# Patient Record
Sex: FEMALE | Race: WHITE | NOT HISPANIC OR LATINO | Employment: FULL TIME | URBAN - METROPOLITAN AREA
[De-identification: names, ages, dates, MRNs, and addresses within clinical notes are randomized per-mention and may not be internally consistent; named-entity substitution may affect disease eponyms.]

---

## 2024-09-26 ENCOUNTER — EVALUATION (OUTPATIENT)
Age: 54
End: 2024-09-26
Payer: COMMERCIAL

## 2024-09-26 DIAGNOSIS — G89.29 CHRONIC PAIN OF RIGHT KNEE: Primary | ICD-10-CM

## 2024-09-26 DIAGNOSIS — M17.11 OSTEOARTHRITIS OF RIGHT KNEE, UNSPECIFIED OSTEOARTHRITIS TYPE: ICD-10-CM

## 2024-09-26 DIAGNOSIS — Z01.818 PRE-OP EVALUATION: ICD-10-CM

## 2024-09-26 DIAGNOSIS — M25.561 CHRONIC PAIN OF RIGHT KNEE: Primary | ICD-10-CM

## 2024-09-26 PROCEDURE — 97161 PT EVAL LOW COMPLEX 20 MIN: CPT

## 2024-09-26 PROCEDURE — 97110 THERAPEUTIC EXERCISES: CPT

## 2024-09-26 NOTE — PROGRESS NOTES
PT Evaluation - Pre-Op Evaluation    Today's date: 2024  Patient name: Pati Meyers  : 1970  MRN: [id number]  Referring provider: Melvin Fernandes MD  Dx:   Encounter Diagnosis     ICD-10-CM    1. Chronic pain of right knee  M25.561     G89.29       2. Osteoarthritis of right knee, unspecified osteoarthritis type  M17.11       3. Pre-op evaluation  Z01.818           Start Time: 745  Stop Time: 830  Total time in clinic (min): 45 minutes    Assessment  Impairments: abnormal coordination, abnormal gait, abnormal muscle firing, abnormal muscle tone, abnormal or restricted ROM, activity intolerance, impaired balance, impaired physical strength, lacks appropriate home exercise program, pain with function, weight-bearing intolerance, poor posture  and poor body mechanics  Functional limitations: bending, lifting, carrying, walking, stair negotiation, transfers, bed mobility  Symptom irritability: high    Assessment details: Pati Meyers is a 54 y.o. female presenting for pre-op examination of R TKA scheduled for 10/3/2024.  Primary impairments include R knee pain with functional activities, RLE weakness, R knee flexion AROM dysfunction, quadriceps motor control dysfunction.  Educated pt on anatomy and physiology of diagnosis.  Will benefit from skilled PT interventions for community reintegration, ADL management/independence, return to work/hobbies.  Provided pt with written home exercise program to be completed daily.    Understanding of Dx/Px/POC: good     Prognosis: good    Goals    Short Term Goals:  In 6 weeks, the patient will:  1. Improve R knee extension to 0 degrees  2. Improve R knee flexion AROM to at least 110 degrees  3. Supervision with HEP for self-care    Long Term Goals:  In 12 weeks, the patient will:  1. Improve R hip flexion and R knee extension strength to at least 4+/5 MMT  2. FOTO to greater than predicted value  3. Independent with HEP for self-care      Plan  Patient  "would benefit from: skilled physical therapy  Planned modality interventions: manual electrical stimulation and cryotherapy    Planned therapy interventions: abdominal trunk stabilization, activity modification, balance, balance/weight bearing training, behavior modification, body mechanics training, community reintegration, coordination, fine motor coordination training, flexibility, functional ROM exercises, gait training, graded activity, graded exercise, graded motor, home exercise program, work reintegration, therapeutic training, therapeutic exercise, therapeutic activities, stretching, strengthening, self care, postural training, patient education, neuromuscular re-education, motor coordination training, massage, manual therapy, joint mobilization and ADL training    Frequency: 2-3x week  Duration in weeks: 12  Plan of Care beginning date: 9/26/2024  Plan of Care expiration date: 12/19/2024  Treatment plan discussed with: patient      Subjective    Pain Location: R medial knee, L lateral knee  Pain Intensity: at rest 4/10, worst 8/10 - constant dull ache, stabbing with stair negotiation  NAN: R TKA  DOI: 10/3/2024  Aggravating Factors: driving, steps, standing, walking, unsteady carrying things, bending over, bed mobility - rolling  Alleviating Factors: oral voltaren, topical voltaren, ice/heat, sleep with compression sleeves  Living Situation: unable to get in bathtub, independent ADLs  Constitutional S/S: lateral R knee n/t - ever since iovera treatment about 2 weeks ago  Goals: \"I would like to be active again, walk and do things with my , lose some weight\"  PLOF: also scheduled for L TKA in December      Objective  Standing Posture & Lower Extremity Alignment:  Structure/Joint         Pelvis (Tilt)  Anterior  Neutral x Posterior   Iliac Crests  Right Elevated x Neutral  Left Elevated   Knee - Frontal  x Genuvalgum  Neutral  Genuvarum   Knee - Sagittal  Genurecurvatum x Neutral     Rearfoot  Valgus " x Neutral  Varus   Forefoot  Abducted x Neutral     Arch x Pes Planus  Neutral  Pes Cavus     Range of Motion: Goniometric revealed the following findings (in degrees).  Joint Motion Right: 9/26/2024 Left: 9/26/2024   Knee Extension 0 0   Knee Flexion 105 107     Strength: MMT revealed the following findings.  Joint Motion Right: 9/26/2024 Left: 9/26/2024   Hip Flexion 3+/5 3+/5   Hip Abduction 3+/5 3+/5   Hip Adduction 3+/5 3+/5   Hip Extension 3+/5 3+/5   Knee Extension 3+/5 3+/5   Knee Flexion 3+/5 3+/5   Ankle Plantarflexion 4/5 4/5   Ankle Dorsiflexion 4/5 4/5     Additional Assessments:  Palpation: mild TTP medial R knee  Gait Pattern: antalgic  Balance: impaired  TUG: 10.44 seconds - UE use on STS descent    Risk Assessment and Prediction Tool results reviewed. Patient reported functional outcome scores reviewed. Discussed DOS and patient's questions were answered to patient's satisfaction. Mobility/ROM results per above. Strength results per above. Balance/Gait (including Timed Up & Go) per above. Virtual Home Assessment was reviewed with patient. Home Preparation Checklist was reviewed with patient including identification of care partner and encouragement of single level set-up. Post-operative pain management expectations discussed to the patient's satisfaction. Post-operative gait training for level ground, stairs, and car transfers was performed. Patient demonstrated competence with immediate post-operative home exercise program.           Precautions: R TKA 10/3/2024    POC expires Unit limit Auth Expiration date PT/OT + Visit Limit?   12/19/24 BOMN N/A BOMN     Visit/Unit Tracking  AUTH Status:  Date 9/26         Used 1         Remaining             Pertinent Findings:                                                                                            Test / Measure  9/26/2024   FOTO Pre-Op (Predicted 59) 40   R knee extension MMT 3+/5   R hip flexion MMT 3+/5   R knee ROM range 0-105   TUG  10.44s UE use on descent         Manuals 9/26            R knee PROM, stretching                                                    Neuro Re-Ed             Quad sets             Glute sets             Heel slides             Mini squats             QS + SLR                                       Ther Ex             HEP review / edu 10'            Rec bike             SAQ             LAQ             HR/TR             Gastroc S             Stad hip 3 way                          Ther Activity             STS             FSU             LSU                                       Gait Training                                       Modalities

## 2024-10-07 ENCOUNTER — OFFICE VISIT (OUTPATIENT)
Age: 54
End: 2024-10-07
Payer: COMMERCIAL

## 2024-10-07 DIAGNOSIS — G89.29 CHRONIC PAIN OF RIGHT KNEE: Primary | ICD-10-CM

## 2024-10-07 DIAGNOSIS — M25.561 CHRONIC PAIN OF RIGHT KNEE: Primary | ICD-10-CM

## 2024-10-07 DIAGNOSIS — M17.11 OSTEOARTHRITIS OF RIGHT KNEE, UNSPECIFIED OSTEOARTHRITIS TYPE: ICD-10-CM

## 2024-10-07 DIAGNOSIS — Z96.651 STATUS POST TOTAL RIGHT KNEE REPLACEMENT: ICD-10-CM

## 2024-10-07 PROCEDURE — 97164 PT RE-EVAL EST PLAN CARE: CPT

## 2024-10-07 PROCEDURE — 97140 MANUAL THERAPY 1/> REGIONS: CPT

## 2024-10-07 PROCEDURE — 97110 THERAPEUTIC EXERCISES: CPT

## 2024-10-07 PROCEDURE — 97112 NEUROMUSCULAR REEDUCATION: CPT

## 2024-10-07 NOTE — PROGRESS NOTES
First Post-Op     Today's date: 10/7/2024  Patient name: Pati Meyers  : 1970  MRN: [id number]  Referring provider: Melvin Fernandes MD  Dx:   Encounter Diagnosis     ICD-10-CM    1. Chronic pain of right knee  M25.561     G89.29       2. Osteoarthritis of right knee, unspecified osteoarthritis type  M17.11       3. Status post total right knee replacement  Z96.651           Start Time: 0900  Stop Time: 1000  Total time in clinic (min): 60 minutes    Subjective: Pt reports compliance with HEP.  R TKA on 10/3/2024.  Pain intensity at 6/10.  States that stair negotiation is better now than before surgery.  Ambulating with rollator currently.  Oxy prn but starting to wean off already - uses mostly with HEP and PT.  States that it feels very stiff.        Objective: See treatment diary below    Range of Motion: Goniometric revealed the following findings (in degrees).  Joint Motion Right: Left:   Knee Extension -2 0   Knee Flexion 80 107     Strength: MMT revealed the following findings.  Joint Motion Right:  Left:   Hip Flexion 3-/5 3+/5   Hip Abduction 3-/5 3+/5   Hip Adduction 3-/5 3+/5   Hip Extension 3-/5 3+/5   Knee Extension 3-/5 3+/5   Knee Flexion 3-/5 3+/5   Ankle Plantarflexion 4/5 4/5   Ankle Dorsiflexion 4/5 4/5     Additional Assessments:  Palpation: mild TTP global R knee, healing incision  Gait Pattern: antalgic, rollator  Balance: impaired  TU.5 seconds - UE assist, rollator    Assessment: Assessment details: Pati Meyers is a 54 y.o. female presenting for post-op of R TKA 10/3/2024.  Primary impairments include R knee pain with functional activities, RLE weakness, R knee flexion AROM dysfunction, quadriceps motor control dysfunction.  Educated pt on anatomy and physiology of diagnosis.  Will benefit from skilled PT interventions for community reintegration, ADL management/independence, return to work/hobbies.  Provided pt with written home exercise program to be completed  daily.  Tolerated treatment well. Patient would benefit from continued PT.  1:1 with Jaziel Aggarwal DPT entirety of tx.    Impairments: abnormal coordination, abnormal gait, abnormal muscle firing, abnormal muscle tone, abnormal or restricted ROM, activity intolerance, impaired balance, impaired physical strength, lacks appropriate home exercise program, pain with function, weight-bearing intolerance, poor posture  and poor body mechanics  Functional limitations: bending, lifting, carrying, walking, stair negotiation, transfers, bed mobility  Symptom irritability: high    Understanding of Dx/Px/POC: good     Prognosis: good    Goals    Short Term Goals:  In 6 weeks, the patient will:  1. Improve R knee extension to 0 degrees - ONGOING  2. Improve R knee flexion AROM to at least 110 degrees - ONGOING  3. Supervision with HEP for self-care - MET    Long Term Goals:  In 12 weeks, the patient will:  1. Improve R hip flexion and R knee extension strength to at least 4+/5 MMT - ONGOING  2. FOTO to greater than predicted value - ONGOING  3. Independent with HEP for self-care - ONGOING    Plan: Continue per plan of care.     Patient would benefit from: skilled physical therapy  Planned modality interventions: manual electrical stimulation and cryotherapy    Planned therapy interventions: abdominal trunk stabilization, activity modification, balance, balance/weight bearing training, behavior modification, body mechanics training, community reintegration, coordination, fine motor coordination training, flexibility, functional ROM exercises, gait training, graded activity, graded exercise, graded motor, home exercise program, work reintegration, therapeutic training, therapeutic exercise, therapeutic activities, stretching, strengthening, self care, postural training, patient education, neuromuscular re-education, motor coordination training, massage, manual therapy, joint mobilization and ADL training    Frequency: 2-3x  "week  Duration in weeks: 12  Plan of Care beginning date: 9/26/2024  Plan of Care expiration date: 12/19/2024  Treatment plan discussed with: patient     Precautions: R TKA 10/3/2024    POC expires Unit limit Auth Expiration date PT/OT + Visit Limit?   12/19/24 BOMN N/A BOMN     Visit/Unit Tracking  AUTH Status:  Date 9/26 10/7        Used 1 2        Remaining             Pertinent Findings:                                                                                            Test / Measure  9/26/2024 10/7/2024   FOTO Post-Op (Predicted 70) 40 Pre-Op 47 Post-Op   R knee extension MMT 3+/5 3-/5   R hip flexion MMT 3+/5 3-/5   R knee ROM range 0-105 2-80   TUG 10.44s UE use on descent 14.50s UE assist, rollator         Manuals 9/26 10/7   R knee PROM, stretching  MM 8'                  Neuro Re-Ed     Quad sets     Glute sets     Heel slides  10x10\" strap   Mini squats     QS + SLR  3x5             Ther Ex     HEP review / edu 10'    Nu-Step - LE only  5' L5   Rec bike     SAQ     LAQ  2x10   HR/TR  2x10 ea   Gastroc S  4x30\" strap   Stand hip 3 way  10x ea B        Ther Activity     STS     FSU     LSU               Gait Training               Modalities                    "

## 2024-10-09 ENCOUNTER — OFFICE VISIT (OUTPATIENT)
Age: 54
End: 2024-10-09
Payer: COMMERCIAL

## 2024-10-09 DIAGNOSIS — Z96.651 STATUS POST TOTAL RIGHT KNEE REPLACEMENT: ICD-10-CM

## 2024-10-09 DIAGNOSIS — G89.29 CHRONIC PAIN OF RIGHT KNEE: Primary | ICD-10-CM

## 2024-10-09 DIAGNOSIS — M17.11 OSTEOARTHRITIS OF RIGHT KNEE, UNSPECIFIED OSTEOARTHRITIS TYPE: ICD-10-CM

## 2024-10-09 DIAGNOSIS — M25.561 CHRONIC PAIN OF RIGHT KNEE: Primary | ICD-10-CM

## 2024-10-09 PROCEDURE — 97110 THERAPEUTIC EXERCISES: CPT

## 2024-10-09 PROCEDURE — 97112 NEUROMUSCULAR REEDUCATION: CPT

## 2024-10-09 PROCEDURE — 97140 MANUAL THERAPY 1/> REGIONS: CPT

## 2024-10-09 NOTE — PROGRESS NOTES
"Daily Note     Today's date: 10/9/2024  Patient name: Pati Meyers  : 1970  MRN: [id number]  Referring provider: Melvin Fernandes MD  Dx:   Encounter Diagnosis     ICD-10-CM    1. Chronic pain of right knee  M25.561     G89.29       2. Osteoarthritis of right knee, unspecified osteoarthritis type  M17.11       3. Status post total right knee replacement  Z96.651           Start Time: 1215  Stop Time: 1300  Total time in clinic (min): 45 minutes    Subjective: Pt reports significant soreness and stiffness following last tx session.  Took pain medication prior to tx session.       Objective: See treatment diary below    Knee flexion = 102 degrees PROM with overpressure    Assessment: Tolerated treatment well. Patient would benefit from continued PT.  Pt able to tolerate continued progression of POC.  Continues to have lateral R knee pain.  R knee extension WNL, working on flexion.  Quad motor control is improving.  1:1 with Jaziel Aggarwal DPT entirety of tx.        Plan: Continue per plan of care.  Progress treatment as tolerated.       Precautions: R TKA 10/3/2024    POC expires Unit limit Auth Expiration date PT/OT + Visit Limit?   24 BOMN N/A BOMN     Visit/Unit Tracking  AUTH Status:  Date 9/26 10/7        Used 1 2        Remaining             Pertinent Findings:                                                                                            Test / Measure  2024 10/7/2024   FOTO Post-Op (Predicted 70) 40 Pre-Op 47 Post-Op   R knee extension MMT 3+/5 3-/5   R hip flexion MMT 3+/5 3-/5   R knee ROM range 0-105 2-80   TUG 10.44s UE use on descent 14.50s UE assist, rollator         Manuals 9/26 10/7 10/9   R knee PROM, stretching  MM 8' MM 8'                     Neuro Re-Ed      Quad sets      Glute sets      Heel slides  10x10\" strap 12x10\" strap   Mini squats      QS + SLR  3x5 3x8               Ther Ex      HEP review / edu 10'     Nu-Step - LE only  5' L5 5' L5   Rec bike    " "  LAQ  2x10 3x10   HR/TR  2x10 ea 2x10 ea   Gastroc S  4x30\" strap    Stand hip 3 way  10x ea B 12x ea B         Ther Activity      STS      FSU      LSU                  Gait Training                  Modalities                         "

## 2024-10-11 ENCOUNTER — OFFICE VISIT (OUTPATIENT)
Age: 54
End: 2024-10-11
Payer: COMMERCIAL

## 2024-10-11 DIAGNOSIS — M25.561 CHRONIC PAIN OF RIGHT KNEE: Primary | ICD-10-CM

## 2024-10-11 DIAGNOSIS — M17.11 OSTEOARTHRITIS OF RIGHT KNEE, UNSPECIFIED OSTEOARTHRITIS TYPE: ICD-10-CM

## 2024-10-11 DIAGNOSIS — G89.29 CHRONIC PAIN OF RIGHT KNEE: Primary | ICD-10-CM

## 2024-10-11 DIAGNOSIS — Z96.651 STATUS POST TOTAL RIGHT KNEE REPLACEMENT: ICD-10-CM

## 2024-10-11 PROCEDURE — 97110 THERAPEUTIC EXERCISES: CPT

## 2024-10-11 PROCEDURE — 97140 MANUAL THERAPY 1/> REGIONS: CPT

## 2024-10-11 PROCEDURE — 97112 NEUROMUSCULAR REEDUCATION: CPT

## 2024-10-11 NOTE — PROGRESS NOTES
"Daily Note     Today's date: 10/11/2024  Patient name: Pati Meyers  : 1970  MRN: [id number]  Referring provider: Melvin Fernandes MD  Dx:   Encounter Diagnosis     ICD-10-CM    1. Chronic pain of right knee  M25.561     G89.29       2. Osteoarthritis of right knee, unspecified osteoarthritis type  M17.11       3. Status post total right knee replacement  Z96.651           Start Time: 1055  Stop Time: 1140  Total time in clinic (min): 45 minutes    Subjective: Pt reports improved independence, but she also notes an increase in swelling due to increased activity.      Objective: See treatment diary below    R knee flexion PROM 116 degrees; R knee flexion AROM 96 degrees    Assessment: Tolerated treatment well. Patient would benefit from continued PT.  Continued knee mobility improvement.  Added in functional training exercises this visit.  Discomfort persists but is much more tolerable to overpressure.  1:1 with Jaziel Aggarwal DPT entirety of tx.        Plan: Continue per plan of care.  Progress treatment as tolerated.       Precautions: R TKA 10/3/2024    POC expires Unit limit Auth Expiration date PT/OT + Visit Limit?   24 BOMN N/A BOMN     Visit/Unit Tracking  AUTH Status:  Date 9/26 10/7 10/11       Used 1 2 3       Remaining             Pertinent Findings:                                                                                            Test / Measure  2024 10/7/2024   FOTO Post-Op (Predicted 70) 40 Pre-Op 47 Post-Op   R knee extension MMT 3+/5 3-/5   R hip flexion MMT 3+/5 3-/5   R knee ROM range 0-105 2-80   TUG 10.44s UE use on descent 14.50s UE assist, rollator         Manuals 9/26 10/7 10/9 10/11   R knee PROM, stretching  MM 8' MM 8' MM 8'                        Neuro Re-Ed       Quad sets       Glute sets       Heel slides  10x10\" strap 12x10\" strap 15x10\" strap   Mini squats       QS + SLR  3x5 3x8 3x10   Sidestepping    5 laps          Ther Ex       HEP review / edu " "10'      Nu-Step - LE only  5' L5 5' L5 6' L5   Rec bike       LAQ  2x10 3x10 3x10 1#   HR/TR  2x10 ea 2x10 ea 2x10 ea   Gastroc S  4x30\" strap     Stand hip 3 way  10x ea B 12x ea B 2x15 ea B          Ther Activity       STS    2x10 22\" plinth   FSU       LSU                     Gait Training       No AD  10ft 2x20ft 2x50ft          Modalities                              "

## 2024-10-14 ENCOUNTER — OFFICE VISIT (OUTPATIENT)
Age: 54
End: 2024-10-14
Payer: COMMERCIAL

## 2024-10-14 DIAGNOSIS — M17.11 OSTEOARTHRITIS OF RIGHT KNEE, UNSPECIFIED OSTEOARTHRITIS TYPE: ICD-10-CM

## 2024-10-14 DIAGNOSIS — M25.561 CHRONIC PAIN OF RIGHT KNEE: Primary | ICD-10-CM

## 2024-10-14 DIAGNOSIS — Z96.651 STATUS POST TOTAL RIGHT KNEE REPLACEMENT: ICD-10-CM

## 2024-10-14 DIAGNOSIS — G89.29 CHRONIC PAIN OF RIGHT KNEE: Primary | ICD-10-CM

## 2024-10-14 PROCEDURE — 97530 THERAPEUTIC ACTIVITIES: CPT

## 2024-10-14 PROCEDURE — 97112 NEUROMUSCULAR REEDUCATION: CPT

## 2024-10-14 PROCEDURE — 97110 THERAPEUTIC EXERCISES: CPT

## 2024-10-14 PROCEDURE — 97140 MANUAL THERAPY 1/> REGIONS: CPT

## 2024-10-14 NOTE — PROGRESS NOTES
Daily Note     Today's date: 10/14/2024  Patient name: Pati Meyers  : 1970  MRN: [id number]  Referring provider: Melvin Fernandes MD  Dx:   Encounter Diagnosis     ICD-10-CM    1. Chronic pain of right knee  M25.561     G89.29       2. Osteoarthritis of right knee, unspecified osteoarthritis type  M17.11       3. Status post total right knee replacement  Z96.651           Start Time: 856  Stop Time: 945  Total time in clinic (min): 49 minutes    Subjective: Pt reports continued RLE swelling; although, walking tolerance/distance is improving.  Compliant with HEP.  Continuing to ice and elevate.  Getting lymphatic massage later today.        Objective: See treatment diary below      Assessment: Tolerated treatment well. Patient would benefit from continued PT.  Pt progressing well.  R knee mobility is improving, and she is better able to tolerate stretching + overpressure into flexion.  Completed stair negotiation at low step height - pt required UE assistance on parallel bar to complete due to apprehension.  Able to complete full revolution on recumbent bike - discussed riding stationary bike at home.  1:1 with Jaziel Aggarwal DPT entirety of tx.        Plan: Continue per plan of care.  Progress treatment as tolerated.       Precautions: R TKA 10/3/2024    POC expires Unit limit Auth Expiration date PT/OT + Visit Limit?   24 BOMN N/A BOMN     Visit/Unit Tracking  AUTH Status:  Date 9/26 10/7 10/11 10/14      Used 1 2 3 4      Remaining             Pertinent Findings:                                                                                            Test / Measure  2024 10/7/2024   FOTO Post-Op (Predicted 70) 40 Pre-Op 47 Post-Op   R knee extension MMT 3+/5 3-/5   R hip flexion MMT 3+/5 3-/5   R knee ROM range 0-105 2-80   TUG 10.44s UE use on descent 14.50s UE assist, rollator         Manuals 9/26 10/7 10/9 10/11 10/14   R knee PROM, stretching  MM 8' MM 8' MM 8' MM 8'               "             Neuro Re-Ed        Heel slides  10x10\" strap 12x10\" strap 15x10\" strap 15x10\" strap   Mini squats        QS + SLR  3x5 3x8 3x10 3x10   Sidestepping    5 laps            Ther Ex        HEP review / edu 10'       Nu-Step - LE only  5' L5 5' L5 6' L5 8' L6   Rec bike     5' L1   LAQ  2x10 3x10 3x10 1# 3x10 1#   HR/TR  2x10 ea 2x10 ea 2x10 ea    Gastroc S  4x30\" strap      Stand hip 3 way  10x ea B 12x ea B 2x15 ea B            Ther Activity        STS    2x10 22\" plinth 2x10 22\" plinth   FSU     2x10 4\"   LSU     2x10 4\"                   Gait Training        No AD  10ft 2x20ft 2x50ft T/o tx session           Modalities                                   "

## 2024-10-16 ENCOUNTER — OFFICE VISIT (OUTPATIENT)
Age: 54
End: 2024-10-16
Payer: COMMERCIAL

## 2024-10-16 DIAGNOSIS — Z96.651 STATUS POST TOTAL RIGHT KNEE REPLACEMENT: ICD-10-CM

## 2024-10-16 DIAGNOSIS — G89.29 CHRONIC PAIN OF RIGHT KNEE: Primary | ICD-10-CM

## 2024-10-16 DIAGNOSIS — M25.561 CHRONIC PAIN OF RIGHT KNEE: Primary | ICD-10-CM

## 2024-10-16 DIAGNOSIS — M17.11 OSTEOARTHRITIS OF RIGHT KNEE, UNSPECIFIED OSTEOARTHRITIS TYPE: ICD-10-CM

## 2024-10-16 PROCEDURE — 97110 THERAPEUTIC EXERCISES: CPT

## 2024-10-16 PROCEDURE — 97530 THERAPEUTIC ACTIVITIES: CPT

## 2024-10-16 PROCEDURE — 97116 GAIT TRAINING THERAPY: CPT

## 2024-10-16 PROCEDURE — 97112 NEUROMUSCULAR REEDUCATION: CPT

## 2024-10-16 PROCEDURE — 97140 MANUAL THERAPY 1/> REGIONS: CPT

## 2024-10-16 NOTE — PROGRESS NOTES
Daily Note     Today's date: 10/16/2024  Patient name: Pati Meyers  : 1970  MRN: [id number]  Referring provider: Melvin Fernandes MD  Dx:   Encounter Diagnosis     ICD-10-CM    1. Chronic pain of right knee  M25.561     G89.29       2. Osteoarthritis of right knee, unspecified osteoarthritis type  M17.11       3. Status post total right knee replacement  Z96.651           Start Time: 0800  Stop Time: 0840  Total time in clinic (min): 40 minutes    Subjective: Pt able to get onto [employer] at home for about 5 minutes.  Got a lymphatic massage earlier this week - scheduled for another next week.  States that ankle feels less swollen now.  States that BP was low after surgery so physician had her stop taking medication - thinks she might have to start taking it again.        Objective: See treatment diary below    BP:  150/100 after warm-up, 160/105 after step ups and STS  Knee flexion PROM = 117 degrees    Assessment: Tolerated treatment well. Patient would benefit from continued PT.  Discussed contacting surgeon and/or PCP to start taking BP medications.  Tolerated slight progression of planned therapeutic interventions this visit.  R knee flexion ROM is steadily improving.  Utilized quad stretch secondary to pt having tightness superior to incision site.  1:1 with Jaziel Aggarwal DPT entirety of tx.        Plan: Continue per plan of care.  Progress treatment as tolerated.       Precautions: R TKA 10/3/2024    POC expires Unit limit Auth Expiration date PT/OT + Visit Limit?   24 BOMN N/A BOMN     Visit/Unit Tracking  AUTH Status:  Date 9/26 10/7 10/11 10/14 10/16    Used 1 2 3 4 5    Remaining            Pertinent Findings:                                                                                            Test / Measure  2024 10/7/2024   FOTO Post-Op (Predicted 70) 40 Pre-Op 47 Post-Op   R knee extension MMT 3+/5 3-/5   R hip flexion MMT 3+/5 3-/5   R knee ROM range 0-105 2-80  "  TUG 10.44s UE use on descent 14.50s UE assist, rollator         Manuals 9/26 10/7 10/9 10/11 10/14 10/16   R knee PROM, stretching  MM 8' MM 8' MM 8' MM 8' MM 8'                              Neuro Re-Ed         Heel slides  10x10\" strap 12x10\" strap 15x10\" strap 15x10\" strap 15x10\" strap   Mini squats         QS + SLR  3x5 3x8 3x10 3x10 10x 0#  2x10 1#   Sidestepping    5 laps              Ther Ex         HEP review / edu 10'        Nu-Step - LE only  5' L5 5' L5 6' L5 8' L6 8' L6   Rec bike     5' L1    LAQ  2x10 3x10 3x10 1# 3x10 1# 3x10 2#   HR/TR  2x10 ea 2x10 ea 2x10 ea     Gastroc S  4x30\" strap       Stand hip 3 way  10x ea B 12x ea B 2x15 ea B     Prone quad S      3x30\" strap   Ther Activity         STS    2x10 22\" plinth 2x10 22\" plinth 2x10 chair + foam   FSU     2x10 4\" 2x10 4\"  10x 6\"   LSU     2x10 4\" 2x10 4\"                     Gait Training         No AD  10ft 2x20ft 2x50ft T/o tx session T/o tx session   SPC      2x30ft   Modalities                                        "

## 2024-10-18 ENCOUNTER — OFFICE VISIT (OUTPATIENT)
Age: 54
End: 2024-10-18
Payer: COMMERCIAL

## 2024-10-18 DIAGNOSIS — M25.561 CHRONIC PAIN OF RIGHT KNEE: Primary | ICD-10-CM

## 2024-10-18 DIAGNOSIS — G89.29 CHRONIC PAIN OF RIGHT KNEE: Primary | ICD-10-CM

## 2024-10-18 DIAGNOSIS — Z96.651 STATUS POST TOTAL RIGHT KNEE REPLACEMENT: ICD-10-CM

## 2024-10-18 DIAGNOSIS — M17.11 OSTEOARTHRITIS OF RIGHT KNEE, UNSPECIFIED OSTEOARTHRITIS TYPE: ICD-10-CM

## 2024-10-18 PROCEDURE — 97140 MANUAL THERAPY 1/> REGIONS: CPT

## 2024-10-18 PROCEDURE — 97110 THERAPEUTIC EXERCISES: CPT

## 2024-10-18 PROCEDURE — 97530 THERAPEUTIC ACTIVITIES: CPT

## 2024-10-18 PROCEDURE — 97112 NEUROMUSCULAR REEDUCATION: CPT

## 2024-10-18 NOTE — PROGRESS NOTES
"Daily Note     Today's date: 10/18/2024  Patient name: Pati Meyers  : 1970  MRN: [id number]  Referring provider: Melvin Fernandes MD  Dx:   Encounter Diagnosis     ICD-10-CM    1. Chronic pain of right knee  M25.561     G89.29       2. Osteoarthritis of right knee, unspecified osteoarthritis type  M17.11       3. Status post total right knee replacement  Z96.651           Start Time: 0800  Stop Time: 0832  Total time in clinic (min): 32 minutes    Subjective: Pt reports being able to get on stationary bike but had significant difficulty doing so.  Able to complete full revolution.        Objective: See treatment diary below    AROM flexion = 104 degrees  PROM flexion = 116 degrees    Assessment: Tolerated treatment well. Patient would benefit from continued PT.  R knee status is continually improving.  Functionality improves as strength, motor control, and mobility improve.  Challenged with slight progression of planned therapeutic interventions.  Quadriceps motor control remains limited at this time.  1:1 with Jaziel Aggarwal DPT entirety of tx.        Plan: Continue per plan of care.  Progress treatment as tolerated.       Precautions: R TKA 10/3/2024    POC expires Unit limit Auth Expiration date PT/OT + Visit Limit?   24 BOMN N/A BOMN     Visit/Unit Tracking  AUTH Status:  Date 9/26 10/7 10/11 10/14 10/16 10/18    Used 1 2 3 4 5 6    Remaining             Pertinent Findings:                                                                                            Test / Measure  2024 10/7/2024   FOTO Post-Op (Predicted 70) 40 Pre-Op 47 Post-Op   R knee extension MMT 3+/5 3-/5   R hip flexion MMT 3+/5 3-/5   R knee ROM range 0-105 2-80   TUG 10.44s UE use on descent 14.50s UE assist, rollator         Manuals 9/26 10/7 10/9 10/11 10/14 10/16 10/18   R knee PROM, stretching  MM 8' MM 8' MM 8' MM 8' MM 8' MM 8'                                 Neuro Re-Ed          Heel slides  10x10\" strap " "12x10\" strap 15x10\" strap 15x10\" strap 15x10\" strap 15x10\" strap   Mini squats          QS + SLR  3x5 3x8 3x10 3x10 10x 0#  2x10 1# 3x10 1#   Sidestepping    5 laps      TKE       2x10 stand gtb   Ther Ex          HEP review / edu 10'         Nu-Step - LE only  5' L5 5' L5 6' L5 8' L6 8' L6 8' L6   Rec bike     5' L1     LAQ  2x10 3x10 3x10 1# 3x10 1# 3x10 2# 3x10 2#   HR/TR  2x10 ea 2x10 ea 2x10 ea      Gastroc S  4x30\" strap        Stand hip 3 way  10x ea B 12x ea B 2x15 ea B      Prone quad S      3x30\" strap 4x30\" strap   Ther Activity          STS    2x10 22\" plinth 2x10 22\" plinth 2x10 chair + foam 2x10 5# chair + foam   FSU     2x10 4\" 2x10 4\"  10x 6\" 2x10 6\"   LSU     2x10 4\" 2x10 4\" 2x10 6\"                       Gait Training          No AD  10ft 2x20ft 2x50ft T/o tx session T/o tx session    SPC      2x30ft    Modalities                                             "

## 2024-10-21 ENCOUNTER — OFFICE VISIT (OUTPATIENT)
Age: 54
End: 2024-10-21
Payer: COMMERCIAL

## 2024-10-21 DIAGNOSIS — M25.561 CHRONIC PAIN OF RIGHT KNEE: Primary | ICD-10-CM

## 2024-10-21 DIAGNOSIS — M17.11 OSTEOARTHRITIS OF RIGHT KNEE, UNSPECIFIED OSTEOARTHRITIS TYPE: ICD-10-CM

## 2024-10-21 DIAGNOSIS — G89.29 CHRONIC PAIN OF RIGHT KNEE: Primary | ICD-10-CM

## 2024-10-21 DIAGNOSIS — Z01.818 PRE-OP EVALUATION: ICD-10-CM

## 2024-10-21 DIAGNOSIS — Z96.651 STATUS POST TOTAL RIGHT KNEE REPLACEMENT: ICD-10-CM

## 2024-10-21 PROCEDURE — 97110 THERAPEUTIC EXERCISES: CPT | Performed by: PHYSICAL THERAPIST

## 2024-10-21 PROCEDURE — 97140 MANUAL THERAPY 1/> REGIONS: CPT | Performed by: PHYSICAL THERAPIST

## 2024-10-21 PROCEDURE — 97112 NEUROMUSCULAR REEDUCATION: CPT | Performed by: PHYSICAL THERAPIST

## 2024-10-21 NOTE — PROGRESS NOTES
"Daily Note     Today's date: 10/21/2024  Patient name: Pati Meyers  : 1970  MRN: [id number]  Referring provider: Melvin Fernandes MD  Dx:   Encounter Diagnosis     ICD-10-CM    1. Chronic pain of right knee  M25.561     G89.29       2. Osteoarthritis of right knee, unspecified osteoarthritis type  M17.11       3. Status post total right knee replacement  Z96.651       4. Pre-op evaluation  Z01.818           Start Time: 0850  Stop Time: 09  Total time in clinic (min): 45 minutes    Subjective: Progressing well, reduced pian and imrpoved ROM.    Objective: See treatment diary below    Assessment: Tolerated treatment well. Patient demonstrated fatigue post treatment, exhibited good technique with therapeutic exercises, and would benefit from continued PT 1:1 with Danilo Pierce DPT for entirety of tx. Excellent knee ROM, BP reading 150/100 as prior visit. Instrcuted to f/u with PCP regarding BP. Reduced cardiovascular load due to pt BP.       Plan: Continue per plan of care.      Precautions: R TKA 10/3/2024    POC expires Unit limit Auth Expiration date PT/OT + Visit Limit?   24 BOMN N/A BOMN     Visit/Unit Tracking  AUTH Status:  Date 9/26 10/7 10/11 10/14 10/16 10/18 10/21    Used 1 2 3 4 5 6 7    Remaining              Pertinent Findings:                                                                                            Test / Measure  2024 10/7/2024   FOTO Post-Op (Predicted 70) 40 Pre-Op 47 Post-Op   R knee extension MMT 3+/5 3-/5   R hip flexion MMT 3+/5 3-/5   R knee ROM range 0-105 2-80   TUG 10.44s UE use on descent 14.50s UE assist, rollator         Manuals 9/26 10/7 10/9 10/11 10/14 10/16 10/18 10/21   R knee PROM, stretching  MM 8' MM 8' MM 8' MM 8' MM 8' MM 8'                                     Neuro Re-Ed           Heel slides  10x10\" strap 12x10\" strap 15x10\" strap 15x10\" strap 15x10\" strap 15x10\" strap 15x10\" strap   Mini squats           QS + SLR  3x5 3x8 " "3x10 3x10 10x 0#  2x10 1# 3x10 1# 3x10 2#   Sidestepping    5 laps       TKE       2x10 stand gtb    Ther Ex           HEP review / edu 10'          Nu-Step - LE only  5' L5 5' L5 6' L5 8' L6 8' L6 8' L6    Rec bike     5' L1   10'   LAQ  2x10 3x10 3x10 1# 3x10 1# 3x10 2# 3x10 2# 3x10 2#   HR/TR  2x10 ea 2x10 ea 2x10 ea       Gastroc S  4x30\" strap         Stand hip 3 way  10x ea B 12x ea B 2x15 ea B       Prone quad S      3x30\" strap 4x30\" strap 4x30\" strap   Ther Activity           STS    2x10 22\" plinth 2x10 22\" plinth 2x10 chair + foam 2x10 5# chair + foam def   FSU     2x10 4\" 2x10 4\"  10x 6\" 2x10 6\" def   LSU     2x10 4\" 2x10 4\" 2x10 6\" def                         Gait Training           No AD  10ft 2x20ft 2x50ft T/o tx session T/o tx session     SPC      2x30ft     Modalities                                                  "

## 2024-10-23 ENCOUNTER — OFFICE VISIT (OUTPATIENT)
Age: 54
End: 2024-10-23
Payer: COMMERCIAL

## 2024-10-23 DIAGNOSIS — Z96.651 STATUS POST TOTAL RIGHT KNEE REPLACEMENT: ICD-10-CM

## 2024-10-23 DIAGNOSIS — M17.11 OSTEOARTHRITIS OF RIGHT KNEE, UNSPECIFIED OSTEOARTHRITIS TYPE: ICD-10-CM

## 2024-10-23 DIAGNOSIS — G89.29 CHRONIC PAIN OF RIGHT KNEE: Primary | ICD-10-CM

## 2024-10-23 DIAGNOSIS — M25.561 CHRONIC PAIN OF RIGHT KNEE: Primary | ICD-10-CM

## 2024-10-23 PROCEDURE — 97530 THERAPEUTIC ACTIVITIES: CPT

## 2024-10-23 PROCEDURE — 97112 NEUROMUSCULAR REEDUCATION: CPT

## 2024-10-23 PROCEDURE — 97110 THERAPEUTIC EXERCISES: CPT

## 2024-10-23 PROCEDURE — 97140 MANUAL THERAPY 1/> REGIONS: CPT

## 2024-10-23 NOTE — PROGRESS NOTES
Daily Note     Today's date: 10/23/2024  Patient name: Pati Meyers  : 1970  MRN: [id number]  Referring provider: Melvin Fernandes MD  Dx:   Encounter Diagnosis     ICD-10-CM    1. Chronic pain of right knee  M25.561     G89.29       2. Osteoarthritis of right knee, unspecified osteoarthritis type  M17.11       3. Status post total right knee replacement  Z96.651           Start Time: 816  Stop Time: 848  Total time in clinic (min): 32 minutes    Subjective: Pt reports continued difficulty sleeping.  First tx session today without taking pain medication prior.      Objective: See treatment diary below    R knee flexion PROM = 118 degrees    Assessment: Tolerated treatment well. Patient would benefit from continued PT.  Pt able to tolerate continued POC this visit.  Appropriate challenge/fatigue with current POC.  Utilize progressive overload principles with strengthening and motor control interventions.  Moderate fatigue noted post-session.  Knee ROM is gradually improving.  1:1 with Jaziel Aggarwal DPT entirety of tx.        Plan: Continue per plan of care.  Progress treatment as tolerated.       Precautions: R TKA 10/3/2024    POC expires Unit limit Auth Expiration date PT/OT + Visit Limit?   24 BOMN N/A BOMN     Visit/Unit Tracking  AUTH Status:  Date 9/26 10/7 10/11 10/14 10/16 10/18 10/21 10/23    Used 1 2 3 4 5 6 7 8    Remaining               Pertinent Findings:                                                                                            Test / Measure  2024 10/7/2024   FOTO Post-Op (Predicted 70) 40 Pre-Op 47 Post-Op   R knee extension MMT 3+/5 3-/5   R hip flexion MMT 3+/5 3-/5   R knee ROM range 0-105 2-80   TUG 10.44s UE use on descent 14.50s UE assist, rollator         Manuals 9/26 10/7 10/9 10/11 10/14 10/16 10/18 10/21 10/23   R knee PROM, stretching  MM 8' MM 8' MM 8' MM 8' MM 8' MM 8'  MM 8'                                       Neuro Re-Ed            Heel  "slides  10x10\" strap 12x10\" strap 15x10\" strap 15x10\" strap 15x10\" strap 15x10\" strap 15x10\" strap 15x10\" strap   Mini squats            QS + SLR  3x5 3x8 3x10 3x10 10x 0#  2x10 1# 3x10 1# 3x10 2# 3x10 1#   Sidestepping    5 laps        TKE       2x10 stand gtb  2x10 stand 9#   Ther Ex            HEP review / edu 10'           Nu-Step - LE only  5' L5 5' L5 6' L5 8' L6 8' L6 8' L6  8' L6   Rec bike     5' L1   10'    LAQ  2x10 3x10 3x10 1# 3x10 1# 3x10 2# 3x10 2# 3x10 2# 3x10 2#   HR/TR  2x10 ea 2x10 ea 2x10 ea        Gastroc S  4x30\" strap          Stand hip 3 way  10x ea B 12x ea B 2x15 ea B        Prone quad S      3x30\" strap 4x30\" strap 4x30\" strap    Ther Activity            STS    2x10 22\" plinth 2x10 22\" plinth 2x10 chair + foam 2x10 5# chair + foam def 2x10 5# chair + foam   FSU     2x10 4\" 2x10 4\"  10x 6\" 2x10 6\" def 2x10 6\"   LSU     2x10 4\" 2x10 4\" 2x10 6\" def 2x10 6\"                           Gait Training            No AD  10ft 2x20ft 2x50ft T/o tx session T/o tx session      SPC      2x30ft      Modalities                                                       "

## 2024-10-25 ENCOUNTER — OFFICE VISIT (OUTPATIENT)
Age: 54
End: 2024-10-25
Payer: COMMERCIAL

## 2024-10-25 DIAGNOSIS — Z96.651 STATUS POST TOTAL RIGHT KNEE REPLACEMENT: ICD-10-CM

## 2024-10-25 DIAGNOSIS — M17.11 OSTEOARTHRITIS OF RIGHT KNEE, UNSPECIFIED OSTEOARTHRITIS TYPE: ICD-10-CM

## 2024-10-25 DIAGNOSIS — M25.561 CHRONIC PAIN OF RIGHT KNEE: Primary | ICD-10-CM

## 2024-10-25 DIAGNOSIS — G89.29 CHRONIC PAIN OF RIGHT KNEE: Primary | ICD-10-CM

## 2024-10-25 PROCEDURE — 97112 NEUROMUSCULAR REEDUCATION: CPT

## 2024-10-25 PROCEDURE — 97530 THERAPEUTIC ACTIVITIES: CPT

## 2024-10-25 PROCEDURE — 97140 MANUAL THERAPY 1/> REGIONS: CPT

## 2024-10-25 PROCEDURE — 97110 THERAPEUTIC EXERCISES: CPT

## 2024-10-25 NOTE — PROGRESS NOTES
Daily Note     Today's date: 10/25/2024  Patient name: Pati Meyers  : 1970  MRN: [id number]  Referring provider: Melvin Fernandes MD  Dx:   Encounter Diagnosis     ICD-10-CM    1. Chronic pain of right knee  M25.561     G89.29       2. Osteoarthritis of right knee, unspecified osteoarthritis type  M17.11       3. Status post total right knee replacement  Z96.651           Start Time: 0800  Stop Time: 09  Total time in clinic (min): 60 minutes    Subjective: Pt reports R knee status is steadily improving.  Concerned with gait pattern and looking down at feet when walking.        Objective: See treatment diary below    R knee flexion PROM = 120 degrees    Assessment: Tolerated treatment well. Patient would benefit from continued PT.  Functionality is gradually improving but remains limited at this time.  Had significant difficulty with eccentric step downs due to quadriceps weakness.  Quadriceps motor control is improving as well but fatigues towards end of tx session.  Hip abductor weakness observed with compensated trendelenberg gait.  1:1 with Jaziel Aggarwal DPT entirety of tx.        Plan: Continue per plan of care.  Progress treatment as tolerated.       Precautions: R TKA 10/3/2024    POC expires Unit limit Auth Expiration date PT/OT + Visit Limit?   24 BOMN N/A BOMN     Visit/Unit Tracking  AUTH Status:  Date 10/11 10/14 10/16 10/18 10/21 10/23 10/25    Used 3 4 5 6 7 8 9    Remaining              Pertinent Findings:                                                                                            Test / Measure  2024 10/7/2024   FOTO Post-Op (Predicted 70) 40 Pre-Op 47 Post-Op   R knee extension MMT 3+/5 3-/5   R hip flexion MMT 3+/5 3-/5   R knee ROM range 0-105 2-80   TUG 10.44s UE use on descent 14.50s UE assist, rollator         Manuals 10/7 10/9 10/11 10/14 10/16 10/18 10/21 10/23 10/25   R knee PROM, stretching MM 8' MM 8' MM 8' MM 8' MM 8' MM 8'  MM 8' MM 8'          "                              Neuro Re-Ed            Heel slides 10x10\" strap 12x10\" strap 15x10\" strap 15x10\" strap 15x10\" strap 15x10\" strap 15x10\" strap 15x10\" strap 15x10\" strap   Mini squats            QS + SLR 3x5 3x8 3x10 3x10 10x 0#  2x10 1# 3x10 1# 3x10 2# 3x10 1# 3x10 1#   Sidestepping   5 laps      3 laps otb   TKE      2x10 stand gtb  2x10 stand 9# 2x10 stand 10#   Ther Ex            HEP review / edu            Nu-Step - LE only 5' L5 5' L5 6' L5 8' L6 8' L6 8' L6  8' L6    Rec bike    5' L1   10'  8' L1   LAQ 2x10 3x10 3x10 1# 3x10 1# 3x10 2# 3x10 2# 3x10 2# 3x10 2# 3x10 3#   HR/TR 2x10 ea 2x10 ea 2x10 ea         Gastroc S 4x30\" strap           Stand hip 3 way 10x ea B 12x ea B 2x15 ea B         Prone quad S     3x30\" strap 4x30\" strap  4x30\" strap 4x30\" strap   Ther Activity            STS   2x10 22\" plinth 2x10 22\" plinth 2x10 chair + foam 2x10 5# chair + foam def 2x10 5# chair + foam 2x10 8# chair + foam   FSU    2x10 4\" 2x10 4\"  10x 6\" 2x10 6\" def 2x10 6\" 15x 6\"   LSU    2x10 4\" 2x10 4\" 2x10 6\" def 2x10 6\" 15x 6\"   Ecc lat step downs         10x 4\" difficult                           Gait Training            No AD 10ft 2x20ft 2x50ft T/o tx session T/o tx session    Assess 1 lap   SPC     2x30ft       Modalities                                                         "

## 2024-10-28 ENCOUNTER — OFFICE VISIT (OUTPATIENT)
Age: 54
End: 2024-10-28
Payer: COMMERCIAL

## 2024-10-28 DIAGNOSIS — M25.561 CHRONIC PAIN OF RIGHT KNEE: Primary | ICD-10-CM

## 2024-10-28 DIAGNOSIS — G89.29 CHRONIC PAIN OF RIGHT KNEE: Primary | ICD-10-CM

## 2024-10-28 DIAGNOSIS — M17.11 OSTEOARTHRITIS OF RIGHT KNEE, UNSPECIFIED OSTEOARTHRITIS TYPE: ICD-10-CM

## 2024-10-28 DIAGNOSIS — Z96.651 STATUS POST TOTAL RIGHT KNEE REPLACEMENT: ICD-10-CM

## 2024-10-28 PROCEDURE — 97140 MANUAL THERAPY 1/> REGIONS: CPT

## 2024-10-28 PROCEDURE — 97530 THERAPEUTIC ACTIVITIES: CPT

## 2024-10-28 PROCEDURE — 97110 THERAPEUTIC EXERCISES: CPT

## 2024-10-28 PROCEDURE — 97112 NEUROMUSCULAR REEDUCATION: CPT

## 2024-10-28 NOTE — PROGRESS NOTES
"Daily Note     Today's date: 10/28/2024  Patient name: aPti Meyers  : 1970  MRN: [id number]  Referring provider: Melvin Fernandes MD  Dx:   Encounter Diagnosis     ICD-10-CM    1. Chronic pain of right knee  M25.561     G89.29       2. Osteoarthritis of right knee, unspecified osteoarthritis type  M17.11       3. Status post total right knee replacement  Z96.651           Start Time: 1408  Stop Time: 1445  Total time in clinic (min): 37 minutes    Subjective: Pt reports being able to ride the stationary bike at home, but she makes sure her  is nearby when getting on/off the machine due to perceived unsteadiness.        Objective: See treatment diary below    R knee flexion PROM = 125 degrees    Assessment: Tolerated treatment well. Patient would benefit from continued PT.  R knee mobility continues to demonstrate improvement.  Knee eccentrics remain difficult to perform secondary to quadriceps motor control / strength.  Functionality is gradually improving.  1:1 with Jaziel Aggarwal DPT entirety of tx.        Plan: Continue per plan of care.  Progress treatment as tolerated.       Precautions: R TKA 10/3/2024    POC expires Unit limit Auth Expiration date PT/OT + Visit Limit?   24 BOMN N/A BOMN     Visit/Unit Tracking  AUTH Status:  Date 10/14 10/16 10/18 10/21 10/23 10/25 10/28    Used 5 6 7 8 9 10 11    Remaining              Pertinent Findings:                                                                                            Test / Measure  2024 10/7/2024   FOTO Post-Op (Predicted 70) 40 Pre-Op 47 Post-Op   R knee extension MMT 3+/5 3-/5   R hip flexion MMT 3+/5 3-/5   R knee ROM range 0-105 2-80   TUG 10.44s UE use on descent 14.50s UE assist, rollator         Manuals 10/11 10/14 10/16 10/18 10/21 10/23 10/25 10/28   R knee PROM, stretching MM 8' MM 8' MM 8' MM 8'  MM 8' MM 8' MM 8'                                    Neuro Re-Ed           Heel slides 15x10\" strap 15x10\" " "strap 15x10\" strap 15x10\" strap 15x10\" strap 15x10\" strap 15x10\" strap 15x10\" strap   Mini squats           QS + SLR 3x10 3x10 10x 0#  2x10 1# 3x10 1# 3x10 2# 3x10 1# 3x10 1# 2x10 2#   Sidestepping 5 laps      3 laps otb    TKE    2x10 stand gtb  2x10 stand 9# 2x10 stand 10#    Ther Ex           HEP review / edu           Nu-Step - LE only 6' L5 8' L6 8' L6 8' L6  8' L6     Rec bike  5' L1   10'  8' L1 8' L2   LAQ 3x10 1# 3x10 1# 3x10 2# 3x10 2# 3x10 2# 3x10 2# 3x10 3#    HR/TR 2x10 ea          Gastroc S           Stand hip 3 way 2x15 ea B          Prone quad S   3x30\" strap 4x30\" strap  4x30\" strap 4x30\" strap    Ther Activity           STS 2x10 22\" plinth 2x10 22\" plinth 2x10 chair + foam 2x10 5# chair + foam def 2x10 5# chair + foam 2x10 8# chair + foam 10x 0#  10x 2#   FSU  2x10 4\" 2x10 4\"  10x 6\" 2x10 6\" def 2x10 6\" 15x 6\" 15x 6\"   LSU  2x10 4\" 2x10 4\" 2x10 6\" def 2x10 6\" 15x 6\" 15x 6\"   Ecc lat step downs       10x 4\" difficult 2x10 4\"                         Gait Training           No AD 2x50ft T/o tx session T/o tx session    Assess 1 lap    SPC   2x30ft        Modalities                                                        "

## 2024-10-30 ENCOUNTER — OFFICE VISIT (OUTPATIENT)
Age: 54
End: 2024-10-30
Payer: COMMERCIAL

## 2024-10-30 DIAGNOSIS — G89.29 CHRONIC PAIN OF RIGHT KNEE: Primary | ICD-10-CM

## 2024-10-30 DIAGNOSIS — M25.561 CHRONIC PAIN OF RIGHT KNEE: Primary | ICD-10-CM

## 2024-10-30 DIAGNOSIS — M17.11 OSTEOARTHRITIS OF RIGHT KNEE, UNSPECIFIED OSTEOARTHRITIS TYPE: ICD-10-CM

## 2024-10-30 DIAGNOSIS — Z96.651 STATUS POST TOTAL RIGHT KNEE REPLACEMENT: ICD-10-CM

## 2024-10-30 PROCEDURE — 97140 MANUAL THERAPY 1/> REGIONS: CPT

## 2024-10-30 PROCEDURE — 97530 THERAPEUTIC ACTIVITIES: CPT

## 2024-10-30 PROCEDURE — 97110 THERAPEUTIC EXERCISES: CPT

## 2024-10-30 PROCEDURE — 97112 NEUROMUSCULAR REEDUCATION: CPT

## 2024-10-30 NOTE — PROGRESS NOTES
Daily Note     Today's date: 10/30/2024  Patient name: Pati Meyers  : 1970  MRN: [id number]  Referring provider: Melvin Fernandes MD  Dx:   Encounter Diagnosis     ICD-10-CM    1. Chronic pain of right knee  M25.561     G89.29       2. Osteoarthritis of right knee, unspecified osteoarthritis type  M17.11       3. Status post total right knee replacement  Z96.651           Start Time: 1200  Stop Time: 1245  Total time in clinic (min): 45 minutes    Subjective: Pt reports compliance with HEP.  Added in hip abductor exercises and feels a little sore at lateral hips.  R knee stiffness noted at start of tx session.  Able to ride 6 miles on the stationary bike in 3 different bouts.        Objective: See treatment diary below    R knee flexion PROM overpressure = 127 degrees    Assessment: Tolerated treatment well. Patient would benefit from continued PT.  Pt able to tolerate continued progression of planned therapeutic interventions this visit.  Most challenged with eccentric step downs - VC's and demonstration to perform correctly.  Eccentric step downs caused slight aggravation of knee pain - pt with compensatory pattern of hip/pelvic movements.  1:1 with Jaziel Aggarwal DPT entirety of tx.        Plan: Continue per plan of care.  Progress treatment as tolerated.       Precautions: R TKA 10/3/2024    POC expires Unit limit Auth Expiration date PT/OT + Visit Limit?   24 BOMN N/A BOMN     Visit/Unit Tracking  AUTH Status:  Date 10/16 10/18 10/21 10/23 10/25 10/28 10/30    Used 6 7 8 9 10 11 12    Remaining              Pertinent Findings:                                                                                            Test / Measure  2024 10/7/2024   FOTO Post-Op (Predicted 70) 40 Pre-Op 47 Post-Op   R knee extension MMT 3+/5 3-/5   R hip flexion MMT 3+/5 3-/5   R knee ROM range 0-105 2-80   TUG 10.44s UE use on descent 14.50s UE assist, rollator         Manuals 10/11 10/14 10/16 10/18  "10/21 10/23 10/25 10/28 10/30   R knee PROM, stretching MM 8' MM 8' MM 8' MM 8'  MM 8' MM 8' MM 8' MM 8'                                       Neuro Re-Ed            Heel slides 15x10\" strap 15x10\" strap 15x10\" strap 15x10\" strap 15x10\" strap 15x10\" strap 15x10\" strap 15x10\" strap 15x10\" strap   Mini squats            QS + SLR 3x10 3x10 10x 0#  2x10 1# 3x10 1# 3x10 2# 3x10 1# 3x10 1# 2x10 2# 2x10 2#   Sidestepping 5 laps      3 laps otb     TKE    2x10 stand gtb  2x10 stand 9# 2x10 stand 10#     Ther Ex            HEP review / edu            Nu-Step - LE only 6' L5 8' L6 8' L6 8' L6  8' L6      Rec bike  5' L1   10'  8' L1 8' L2 8' L2   LAQ 3x10 1# 3x10 1# 3x10 2# 3x10 2# 3x10 2# 3x10 2# 3x10 3#     HR/TR 2x10 ea           Gastroc S            Stand hip 3 way 2x15 ea B        Abd only 2x10 B 5#   Prone quad S   3x30\" strap 4x30\" strap  4x30\" strap 4x30\" strap 4x30\" strap    Ther Activity            STS 2x10 22\" plinth 2x10 22\" plinth 2x10 chair + foam 2x10 5# chair + foam def 2x10 5# chair + foam 2x10 8# chair + foam 10x 0#  10x 2# 10x 3#  10x 4#   FSU  2x10 4\" 2x10 4\"  10x 6\" 2x10 6\" def 2x10 6\" 15x 6\" 15x 6\" 15x 9\"   LSU  2x10 4\" 2x10 4\" 2x10 6\" def 2x10 6\" 15x 6\" 15x 6\" 15x 9\"   Ecc lat step downs       10x 4\" difficult 2x10 4\" 2x10 4\"                           Gait Training            No AD 2x50ft T/o tx session T/o tx session    Assess 1 lap     SPC   2x30ft         Modalities                                                             "

## 2024-11-01 ENCOUNTER — OFFICE VISIT (OUTPATIENT)
Age: 54
End: 2024-11-01
Payer: COMMERCIAL

## 2024-11-01 DIAGNOSIS — M17.11 OSTEOARTHRITIS OF RIGHT KNEE, UNSPECIFIED OSTEOARTHRITIS TYPE: ICD-10-CM

## 2024-11-01 DIAGNOSIS — M25.561 CHRONIC PAIN OF RIGHT KNEE: Primary | ICD-10-CM

## 2024-11-01 DIAGNOSIS — G89.29 CHRONIC PAIN OF RIGHT KNEE: Primary | ICD-10-CM

## 2024-11-01 DIAGNOSIS — Z96.651 STATUS POST TOTAL RIGHT KNEE REPLACEMENT: ICD-10-CM

## 2024-11-01 PROCEDURE — 97530 THERAPEUTIC ACTIVITIES: CPT

## 2024-11-01 PROCEDURE — 97112 NEUROMUSCULAR REEDUCATION: CPT

## 2024-11-01 PROCEDURE — 97110 THERAPEUTIC EXERCISES: CPT

## 2024-11-01 PROCEDURE — 97140 MANUAL THERAPY 1/> REGIONS: CPT

## 2024-11-01 NOTE — PROGRESS NOTES
Daily Note     Today's date: 2024  Patient name: Pati Meyers  : 1970  MRN: [id number]  Referring provider: Melvin Fernandes MD  Dx:   Encounter Diagnosis     ICD-10-CM    1. Chronic pain of right knee  M25.561     G89.29       2. Osteoarthritis of right knee, unspecified osteoarthritis type  M17.11       3. Status post total right knee replacement  Z96.651           Start Time: 0700  Stop Time: 07  Total time in clinic (min): 32 minutes    Subjective: Saw ortho yesterday - pleased with current progress.  States current stiffness and swelling this morning.        Objective: See treatment diary below    R knee flexion PROM = 128 degrees    Assessment: Tolerated treatment well. Patient would benefit from continued PT.  Pt able to tolerate progression of planned therapeutic interventions.  Further addressed hip abductor strength - weakness is contributing to gait dysfunction.  Knee flexion continues to improve.  Knee extension WNL.  Difficulty with eccentric lowering from step due to quadriceps weakness.  1:1 with Jaziel Aggarwal DPT entirety of tx.        Plan: Continue per plan of care.  Progress treatment as tolerated.       Precautions: R TKA 10/3/2024    POC expires Unit limit Auth Expiration date PT/OT + Visit Limit?   24 BOMN N/A BOMN     Visit/Unit Tracking  AUTH Status:  Date 10/18 10/21 10/23 10/25 10/28 10/30 11/1    Used 7 8 9 10 11 12 13    Remaining              Pertinent Findings:                                                                                            Test / Measure  2024 10/7/2024   FOTO Post-Op (Predicted 70) 40 Pre-Op 47 Post-Op   R knee extension MMT 3+/5 3-/5   R hip flexion MMT 3+/5 3-/5   R knee ROM range 0-105 2-80   TUG 10.44s UE use on descent 14.50s UE assist, rollator         Manuals 10/14 10/16 10/18 10/21 10/23 10/25 10/28 10/30 11/1   R knee PROM, stretching MM 8' MM 8' MM 8'  MM 8' MM 8' MM 8' MM 8' MM 8'                                    "    Neuro Re-Ed            Heel slides 15x10\" strap 15x10\" strap 15x10\" strap 15x10\" strap 15x10\" strap 15x10\" strap 15x10\" strap 15x10\" strap 15x10\" strap   QS + SLR 3x10 10x 0#  2x10 1# 3x10 1# 3x10 2# 3x10 1# 3x10 1# 2x10 2# 2x10 2# 2x10 2#   Sidestepping      3 laps otb      TKE   2x10 stand gtb  2x10 stand 9# 2x10 stand 10#      Ther Ex            HEP review / edu            Nu-Step - LE only 8' L6 8' L6 8' L6  8' L6       Rec bike 5' L1   10'  8' L1 8' L2 8' L2 8' L2   LAQ 3x10 1# 3x10 2# 3x10 2# 3x10 2# 3x10 2# 3x10 3#   3x10 4#   HR/TR            Gastroc S            Stand hip 3 way        Abd only 2x10 B 5#    Prone quad S  3x30\" strap 4x30\" strap  4x30\" strap 4x30\" strap 4x30\" strap     S/L hip abd         Knee bent 2x10   Ther Activity            STS 2x10 22\" plinth 2x10 chair + foam 2x10 5# chair + foam def 2x10 5# chair + foam 2x10 8# chair + foam 10x 0#  10x 2# 10x 3#  10x 4# 10x 4#  10x 5#   FSU 2x10 4\" 2x10 4\"  10x 6\" 2x10 6\" def 2x10 6\" 15x 6\" 15x 6\" 15x 9\" 15x 9\"   LSU 2x10 4\" 2x10 4\" 2x10 6\" def 2x10 6\" 15x 6\" 15x 6\" 15x 9\" 15x 9\"   Ecc lat step downs      10x 4\" difficult 2x10 4\" 2x10 4\"    Ecc fwd step downs         2x10 4\"   Half kneel transfer training         NV -->   Gait Training            No AD T/o tx session T/o tx session    Assess 1 lap      SPC  2x30ft          Modalities                                                               "

## 2024-11-04 ENCOUNTER — OFFICE VISIT (OUTPATIENT)
Age: 54
End: 2024-11-04
Payer: COMMERCIAL

## 2024-11-04 DIAGNOSIS — M17.11 OSTEOARTHRITIS OF RIGHT KNEE, UNSPECIFIED OSTEOARTHRITIS TYPE: ICD-10-CM

## 2024-11-04 DIAGNOSIS — Z96.651 STATUS POST TOTAL RIGHT KNEE REPLACEMENT: ICD-10-CM

## 2024-11-04 DIAGNOSIS — M25.561 CHRONIC PAIN OF RIGHT KNEE: Primary | ICD-10-CM

## 2024-11-04 DIAGNOSIS — G89.29 CHRONIC PAIN OF RIGHT KNEE: Primary | ICD-10-CM

## 2024-11-04 PROCEDURE — 97530 THERAPEUTIC ACTIVITIES: CPT

## 2024-11-04 PROCEDURE — 97110 THERAPEUTIC EXERCISES: CPT

## 2024-11-04 PROCEDURE — 97140 MANUAL THERAPY 1/> REGIONS: CPT

## 2024-11-04 PROCEDURE — 97112 NEUROMUSCULAR REEDUCATION: CPT

## 2024-11-04 NOTE — PROGRESS NOTES
"Daily Note     Today's date: 2024  Patient name: Pati Meyers  : 1970  MRN: [id number]  Referring provider: Melvin Fernandes MD  Dx:   Encounter Diagnosis     ICD-10-CM    1. Chronic pain of right knee  M25.561     G89.29       2. Osteoarthritis of right knee, unspecified osteoarthritis type  M17.11       3. Status post total right knee replacement  Z96.651           Start Time: 1530  Stop Time: 1615  Total time in clinic (min): 45 minutes    Subjective: Pt reports significant soreness following last tx session.  Soreness was much improved the following day.  Able to tolerate more walking over the weekend.        Objective: See treatment diary below      Assessment: Tolerated treatment well. Patient would benefit from continued PT.  Pt apprehensive about performing modified half knee transfer training, but she was able to complete with BUE assistance and elevated the \"floor\".  Good tolerance to slight progression of planned therapeutic interventions.  Mild fatigue noted post-session.  Continues to have a challenge and soreness with hip abduction.  Knee flexion PROM 121 degrees.  1:1 with Jaziel Aggarwal DPT entirety of tx.        Plan: Continue per plan of care.  Progress treatment as tolerated.       Precautions: R TKA 10/3/2024    POC expires Unit limit Auth Expiration date PT/OT + Visit Limit?   24 BOMN N/A BOMN     Visit/Unit Tracking  AUTH Status:  Date 10/18 10/21 10/23 10/25 10/28 10/30 11/1 11/4    Used 7 8 9 10 11 12 13 14    Remaining               Pertinent Findings:                                                                                            Test / Measure  2024 10/7/2024 2024   FOTO Post-Op (Predicted 70) 40 Pre-Op 47 Post-Op 61   R knee extension MMT 3+/5 3-/5    R hip flexion MMT 3+/5 3-/5    R knee ROM range 0-105 2-80    TUG 10.44s UE use on descent 14.50s UE assist, rollator          Manuals 10/18 10/21 10/23 10/25 10/28 10/30 11/1 11/4   R knee " "PROM, stretching MM 8'  MM 8' MM 8' MM 8' MM 8' MM 8' MM 8'                                    Neuro Re-Ed           Heel slides 15x10\" strap 15x10\" strap 15x10\" strap 15x10\" strap 15x10\" strap 15x10\" strap 15x10\" strap 15x10\" strap   QS + SLR 3x10 1# 3x10 2# 3x10 1# 3x10 1# 2x10 2# 2x10 2# 2x10 2# 2x10 3#   Sidestepping    3 laps otb       TKE 2x10 stand gtb  2x10 stand 9# 2x10 stand 10#       Ther Ex           HEP review / edu           Nu-Step - LE only 8' L6  8' L6        Rec bike  10'  8' L1 8' L2 8' L2 8' L2 8' L2   LAQ 3x10 2# 3x10 2# 3x10 2# 3x10 3#   3x10 4# 3x10 5#   HR/TR           Gastroc S           Stand hip 3 way      Abd only 2x10 B 5#     Prone quad S 4x30\" strap  4x30\" strap 4x30\" strap 4x30\" strap   4x30\" strap   S/L hip abd       Knee bent 2x10 Knee bent 2x10   Ther Activity           STS 2x10 5# chair + foam def 2x10 5# chair + foam 2x10 8# chair + foam 10x 0#  10x 2# 10x 3#  10x 4# 10x 4#  10x 5# 2x10 5#   FSU 2x10 6\" def 2x10 6\" 15x 6\" 15x 6\" 15x 9\" 15x 9\"    LSU 2x10 6\" def 2x10 6\" 15x 6\" 15x 6\" 15x 9\" 15x 9\"    Ecc lat step downs    10x 4\" difficult 2x10 4\" 2x10 4\"     Ecc fwd step downs       2x10 4\"    Half kneel transfer training       NV --> 5x B to 4\" step and blue and green pads   Gait Training           No AD    Assess 1 lap       SPC           Modalities                                                              "

## 2024-11-06 ENCOUNTER — OFFICE VISIT (OUTPATIENT)
Age: 54
End: 2024-11-06
Payer: COMMERCIAL

## 2024-11-06 DIAGNOSIS — M25.561 CHRONIC PAIN OF RIGHT KNEE: Primary | ICD-10-CM

## 2024-11-06 DIAGNOSIS — G89.29 CHRONIC PAIN OF RIGHT KNEE: Primary | ICD-10-CM

## 2024-11-06 DIAGNOSIS — Z96.651 STATUS POST TOTAL RIGHT KNEE REPLACEMENT: ICD-10-CM

## 2024-11-06 DIAGNOSIS — M17.11 OSTEOARTHRITIS OF RIGHT KNEE, UNSPECIFIED OSTEOARTHRITIS TYPE: ICD-10-CM

## 2024-11-06 PROCEDURE — 97140 MANUAL THERAPY 1/> REGIONS: CPT

## 2024-11-06 PROCEDURE — 97112 NEUROMUSCULAR REEDUCATION: CPT

## 2024-11-06 PROCEDURE — 97110 THERAPEUTIC EXERCISES: CPT

## 2024-11-06 PROCEDURE — 97530 THERAPEUTIC ACTIVITIES: CPT

## 2024-11-06 NOTE — PROGRESS NOTES
Daily Note     Today's date: 2024  Patient name: Pati Meyers  : 1970  MRN: [id number]  Referring provider: Melvin Fernandes MD  Dx:   Encounter Diagnosis     ICD-10-CM    1. Chronic pain of right knee  M25.561     G89.29       2. Osteoarthritis of right knee, unspecified osteoarthritis type  M17.11       3. Status post total right knee replacement  Z96.651           Start Time: 1200  Stop Time: 1245  Total time in clinic (min): 45 minutes    Subjective: Able to walk 1 mile yesterday - moderate soreness after walking.  Had best night of sleep last night since the surgery - only woke up once.      Objective: See treatment diary below    R knee flexion PROM = 125 degrees    Assessment: Tolerated treatment well. Patient would benefit from continued PT.  Functionality and RLE strength is gradually improving.  Limitation persists with half kneeling position, but she was better able to perform this visit compared to first attempt last visit.  Working into half kneel is important to be able to facilitate floor transfers independently.  1:1 with Jaziel Aggarwal DPT entirety of tx.        Plan: Continue per plan of care.  Progress treatment as tolerated.       Precautions: R TKA 10/3/2024    POC expires Unit limit Auth Expiration date PT/OT + Visit Limit?   24 BOMN N/A BOMN     Visit/Unit Tracking  AUTH Status:  Date 10/23 10/25 10/28 10/30 11/1 11/4 11    Used 9 10 11 12 13 14 15    Remaining              Pertinent Findings:                                                                                            Test / Measure  2024 10/7/2024 2024   FOTO Post-Op (Predicted 70) 40 Pre-Op 47 Post-Op 61   R knee extension MMT 3+/5 3-/5    R hip flexion MMT 3+/5 3-/5    R knee ROM range 0-105 2-80    TUG 10.44s UE use on descent 14.50s UE assist, rollator          Manuals 10/21 10/23 10/25 10/28 10/30 11/1 11 11/6   R knee PROM, stretching  MM 8' MM 8' MM 8' MM 8' MM 8' MM 8' MM 8'        "                             Neuro Re-Ed           Heel slides 15x10\" strap 15x10\" strap 15x10\" strap 15x10\" strap 15x10\" strap 15x10\" strap 15x10\" strap 15x10\" strap   QS + SLR 3x10 2# 3x10 1# 3x10 1# 2x10 2# 2x10 2# 2x10 2# 2x10 3# 2x10 3#   Sidestepping   3 laps otb        David TKE  2x10 9# 2x10 10#     2x10 14#   Ther Ex           HEP review / edu           Nu-Step - LE only  8' L6         Rec bike 10'  8' L1 8' L2 8' L2 8' L2 8' L2 8' L2   LAQ 3x10 2# 3x10 2# 3x10 3#   3x10 4# 3x10 5# 3x10 5#   HR/TR           Gastroc S           Stand hip 3 way     Abd only 2x10 B 5#      Prone quad S  4x30\" strap 4x30\" strap 4x30\" strap   4x30\" strap    S/L hip abd      Knee bent 2x10 Knee bent 2x10    Kickstand RDLs        2x8 R 10# kb   Ther Activity           STS def 2x10 5# chair + foam 2x10 8# chair + foam 10x 0#  10x 2# 10x 3#  10x 4# 10x 4#  10x 5# 2x10 5#    FSU def 2x10 6\" 15x 6\" 15x 6\" 15x 9\" 15x 9\"     LSU def 2x10 6\" 15x 6\" 15x 6\" 15x 9\" 15x 9\"     Ecc lat step downs   10x 4\" difficult 2x10 4\" 2x10 4\"      Ecc fwd step downs      2x10 4\"     Half kneel transfer training      NV --> 5x B to 4\" step and blue and green pads 5x B to 4\" step and blue and green pads   Gait Training           No AD   Assess 1 lap        SPC           Modalities                                                                "

## 2024-11-07 ENCOUNTER — OFFICE VISIT (OUTPATIENT)
Age: 54
End: 2024-11-07
Payer: COMMERCIAL

## 2024-11-07 DIAGNOSIS — G89.29 CHRONIC PAIN OF RIGHT KNEE: Primary | ICD-10-CM

## 2024-11-07 DIAGNOSIS — M17.11 OSTEOARTHRITIS OF RIGHT KNEE, UNSPECIFIED OSTEOARTHRITIS TYPE: ICD-10-CM

## 2024-11-07 DIAGNOSIS — M25.561 CHRONIC PAIN OF RIGHT KNEE: Primary | ICD-10-CM

## 2024-11-07 DIAGNOSIS — Z96.651 STATUS POST TOTAL RIGHT KNEE REPLACEMENT: ICD-10-CM

## 2024-11-07 PROCEDURE — 97110 THERAPEUTIC EXERCISES: CPT

## 2024-11-07 PROCEDURE — 97112 NEUROMUSCULAR REEDUCATION: CPT

## 2024-11-07 PROCEDURE — 97530 THERAPEUTIC ACTIVITIES: CPT

## 2024-11-07 PROCEDURE — 97140 MANUAL THERAPY 1/> REGIONS: CPT

## 2024-11-07 NOTE — PROGRESS NOTES
"Daily Note     Today's date: 2024  Patient name: Pati Meyers  : 1970  MRN: [id number]  Referring provider: Melvin Fernandes MD  Dx:   Encounter Diagnosis     ICD-10-CM    1. Chronic pain of right knee  M25.561     G89.29       2. Osteoarthritis of right knee, unspecified osteoarthritis type  M17.11       3. Status post total right knee replacement  Z96.651           Start Time: 1524  Stop Time: 1600  Total time in clinic (min): 36 minutes    Subjective: Pt reports feeling tight and stiff following yesterday's tx session.        Objective: See treatment diary below      Assessment: Tolerated treatment well. Patient would benefit from continued PT.  Pt with improvement of symptoms post-session.  Improvement of hip abductor strength but remains limited.  Held kickstand RDLs secondary to hamstring soreness.  1:1 with Jaziel Aggarwal DPT entirety of tx.        Plan: Continue per plan of care.  Progress treatment as tolerated.       Precautions: R TKA 10/3/2024    POC expires Unit limit Auth Expiration date PT/OT + Visit Limit?   24 BOMN N/A BOMN     Visit/Unit Tracking  AUTH Status:  Date 10/25 10/28 10/30 11/1 11/4 11/6 11/7    Used 10 11 12 13 14 15 16    Remaining              Pertinent Findings:                                                                                            Test / Measure  2024 10/7/2024 2024   FOTO Post-Op (Predicted 70) 40 Pre-Op 47 Post-Op 61   R knee extension MMT 3+/5 3-/5    R hip flexion MMT 3+/5 3-/5    R knee ROM range 0-105 2-80    TUG 10.44s UE use on descent 14.50s UE assist, rollator          Manuals 10/23 10/25 10/28 10/30 11/1 11/4 11/6 11/7   R knee PROM, stretching MM 8' MM 8' MM 8' MM 8' MM 8' MM 8' MM 8' MM 8'                                    Neuro Re-Ed           Heel slides 15x10\" strap 15x10\" strap 15x10\" strap 15x10\" strap 15x10\" strap 15x10\" strap 15x10\" strap 15x10\" strap   QS + SLR 3x10 1# 3x10 1# 2x10 2# 2x10 2# 2x10 2# 2x10 " "3# 2x10 3#    Sidestepping  3 laps otb         [place] TKE 2x10 9# 2x10 10#     2x10 14#    Ther Ex           HEP review / edu           Nu-Step - LE only 8' L6          Rec bike  8' L1 8' L2 8' L2 8' L2 8' L2 8' L2 8' L2   LAQ 3x10 2# 3x10 3#   3x10 4# 3x10 5# 3x10 5#    Stand hip 3 way    Abd only 2x10 B 5#       Prone quad S 4x30\" strap 4x30\" strap 4x30\" strap   4x30\" strap  4x30\" strap   Supine HS S        4x30\" strap   S/L hip abd     Knee bent 2x10 Knee bent 2x10  Knee bent 10x    Knee ext 2x5   Kickstand RDLs       2x8 R 10# kb    Ther Activity           STS 2x10 5# chair + foam 2x10 8# chair + foam 10x 0#  10x 2# 10x 3#  10x 4# 10x 4#  10x 5# 2x10 5#  2x10 6#   FSU 2x10 6\" 15x 6\" 15x 6\" 15x 9\" 15x 9\"   Runner's 15x alt LE 9\"   LSU 2x10 6\" 15x 6\" 15x 6\" 15x 9\" 15x 9\"      Ecc lat step downs  10x 4\" difficult 2x10 4\" 2x10 4\"       Ecc fwd step downs     2x10 4\"      Half kneel transfer training     NV --> 5x B to 4\" step and blue and green pads 5x B to 4\" step and blue and green pads    Gait Training           No AD  Assess 1 lap         SPC           Modalities                                                                  "

## 2024-11-11 ENCOUNTER — OFFICE VISIT (OUTPATIENT)
Age: 54
End: 2024-11-11
Payer: COMMERCIAL

## 2024-11-11 DIAGNOSIS — M25.561 CHRONIC PAIN OF RIGHT KNEE: Primary | ICD-10-CM

## 2024-11-11 DIAGNOSIS — Z96.651 STATUS POST TOTAL RIGHT KNEE REPLACEMENT: ICD-10-CM

## 2024-11-11 DIAGNOSIS — G89.29 CHRONIC PAIN OF RIGHT KNEE: Primary | ICD-10-CM

## 2024-11-11 DIAGNOSIS — M17.11 OSTEOARTHRITIS OF RIGHT KNEE, UNSPECIFIED OSTEOARTHRITIS TYPE: ICD-10-CM

## 2024-11-11 PROCEDURE — 97140 MANUAL THERAPY 1/> REGIONS: CPT

## 2024-11-11 PROCEDURE — 97112 NEUROMUSCULAR REEDUCATION: CPT

## 2024-11-11 PROCEDURE — 97530 THERAPEUTIC ACTIVITIES: CPT

## 2024-11-11 PROCEDURE — 97110 THERAPEUTIC EXERCISES: CPT

## 2024-11-11 NOTE — PROGRESS NOTES
"Daily Note     Today's date: 2024  Patient name: Pati Meyers  : 1970  MRN: [id number]  Referring provider: Melvin Fernandes MD  Dx:   Encounter Diagnosis     ICD-10-CM    1. Chronic pain of right knee  M25.561     G89.29       2. Osteoarthritis of right knee, unspecified osteoarthritis type  M17.11       3. Status post total right knee replacement  Z96.651           Start Time: 1630  Stop Time: 1702  Total time in clinic (min): 32 minutes    Subjective: Pt reports tightness and stiffness after working for full day for the first time since the surgery.      Objective: See treatment diary below      Assessment: Tolerated treatment well. Patient would benefit from continued PT.  Able to achieve 120 degrees knee flexion.  Functionality is continuing to improve - will assess half kneel position when able.  Added in leg press this visit.  1:1 with Jaziel Aggarwal DPT entirety of tx.        Plan: Continue per plan of care.  Progress treatment as tolerated.       Precautions: R TKA 10/3/2024    POC expires Unit limit Auth Expiration date PT/OT + Visit Limit?   24 BOMN N/A BOMN     Visit/Unit Tracking  AUTH Status:  Date 10/28 10/30 11/1 11/4 11/6 11/7 11/11    Used 11 12 13 14 15 16 17    Remaining              Pertinent Findings:                                                                                            Test / Measure  2024 10/7/2024 2024   FOTO Post-Op (Predicted 70) 40 Pre-Op 47 Post-Op 61   R knee extension MMT 3+/5 3-/5    R hip flexion MMT 3+/5 3-/5    R knee ROM range 0-105 2-80    TUG 10.44s UE use on descent 14.50s UE assist, rollator          Manuals 10/25 10/28 10/30 11/1 11/4 11/6 11/7 11/11   R knee PROM, stretching MM 8' MM 8' MM 8' MM 8' MM 8' MM 8' MM 8'                                     Neuro Re-Ed           Heel slides 15x10\" strap 15x10\" strap 15x10\" strap 15x10\" strap 15x10\" strap 15x10\" strap 15x10\" strap    QS + SLR 3x10 1# 2x10 2# 2x10 2# 2x10 2# " "2x10 3# 2x10 3#     Sidestepping 3 laps otb       3 laps gtb   TB monster walk        3 laps gtb   David TKE 2x10 10#     2x10 14#  2x10 17#   Ther Ex           HEP review / edu           Nu-Step - LE only           Rec bike 8' L1 8' L2 8' L2 8' L2 8' L2 8' L2 8' L2 8' L3   LAQ 3x10 3#   3x10 4# 3x10 5# 3x10 5#  3x10 5#   Stand hip 3 way   Abd only 2x10 B 5#        Prone quad S 4x30\" strap 4x30\" strap   4x30\" strap  4x30\" strap    Supine HS S       4x30\" strap 4x30\" strap   S/L hip abd    Knee bent 2x10 Knee bent 2x10  Knee bent 10x    Knee ext 2x5    Kickstand RDLs      2x8 R 10# kb     R SL leg press        10x 45#  10x 55#  10x 65#   Ther Activity           STS 2x10 8# chair + foam 10x 0#  10x 2# 10x 3#  10x 4# 10x 4#  10x 5# 2x10 5#  2x10 6#    FSU 15x 6\" 15x 6\" 15x 9\" 15x 9\"   Runner's 15x alt LE 9\" Runner's 15x alt LE 9\"   LSU 15x 6\" 15x 6\" 15x 9\" 15x 9\"       Ecc lat step downs 10x 4\" difficult 2x10 4\" 2x10 4\"        Ecc fwd step downs    2x10 4\"       Half kneel transfer training    NV --> 5x B to 4\" step and blue and green pads 5x B to 4\" step and blue and green pads     Gait Training           No AD Assess 1 lap          SPC           Modalities                                                                    "

## 2024-11-14 ENCOUNTER — OFFICE VISIT (OUTPATIENT)
Age: 54
End: 2024-11-14
Payer: COMMERCIAL

## 2024-11-14 DIAGNOSIS — G89.29 CHRONIC PAIN OF RIGHT KNEE: Primary | ICD-10-CM

## 2024-11-14 DIAGNOSIS — M25.561 CHRONIC PAIN OF RIGHT KNEE: Primary | ICD-10-CM

## 2024-11-14 DIAGNOSIS — M17.11 OSTEOARTHRITIS OF RIGHT KNEE, UNSPECIFIED OSTEOARTHRITIS TYPE: ICD-10-CM

## 2024-11-14 DIAGNOSIS — Z96.651 STATUS POST TOTAL RIGHT KNEE REPLACEMENT: ICD-10-CM

## 2024-11-14 PROCEDURE — 97140 MANUAL THERAPY 1/> REGIONS: CPT

## 2024-11-14 PROCEDURE — 97112 NEUROMUSCULAR REEDUCATION: CPT

## 2024-11-14 PROCEDURE — 97530 THERAPEUTIC ACTIVITIES: CPT

## 2024-11-14 PROCEDURE — 97110 THERAPEUTIC EXERCISES: CPT

## 2024-11-14 NOTE — PROGRESS NOTES
Daily Note     Today's date: 2024  Patient name: Pati Meyers  : 1970  MRN: [id number]  Referring provider: Melvin Fernandes MD  Dx:   Encounter Diagnosis     ICD-10-CM    1. Chronic pain of right knee  M25.561     G89.29       2. Osteoarthritis of right knee, unspecified osteoarthritis type  M17.11       3. Status post total right knee replacement  Z96.651           Start Time: 1145  Stop Time: 1230  Total time in clinic (min): 45 minutes    Subjective: Pt reports being able to ride stationary bike and is compliant with HEP.  States R knee stiffness and discomfort persists in the morning and after/during work.        Objective: See treatment diary below    R knee flexion PROM = 125 degrees    Assessment: Tolerated treatment well. Patient would benefit from continued PT.  At end of tx session pt states that she is concerned with her ability to complete floor transfers.  Will assess next visit.  Continued improvement of quadriceps motor control and functionality.  Hip abd strength remains limited but improving.  1:1 with Jaziel Aggarwal DPT entirety of tx.        Plan: Continue per plan of care.  Progress treatment as tolerated.       Precautions: R TKA 10/3/2024    POC expires Unit limit Auth Expiration date PT/OT + Visit Limit?   24 BOMN N/A BOMN     Visit/Unit Tracking  AUTH Status:  Date 10/30 11/1 11/4 11/6 11/7 11/11 11/14    Used 12 13 14 15 16 17 18    Remaining              Pertinent Findings:                                                                                            Test / Measure  2024 10/7/2024 2024   FOTO Post-Op (Predicted 70) 40 Pre-Op 47 Post-Op 61   R knee extension MMT 3+/5 3-/5    R hip flexion MMT 3+/5 3-/5    R knee ROM range 0-105 2-80    TUG 10.44s UE use on descent 14.50s UE assist, rollator          Manuals 10/28 10/30 11/1 11/4 11/6 11/7 11/11 11/14   R knee PROM, stretching MM 8' MM 8' MM 8' MM 8' MM 8' MM 8' MM 8' MM 8'                     "                Neuro Re-Ed           Heel slides 15x10\" strap 15x10\" strap 15x10\" strap 15x10\" strap 15x10\" strap 15x10\" strap 15x10\" strap 15x10\" strap   QS + SLR 2x10 2# 2x10 2# 2x10 2# 2x10 3# 2x10 3#   2x10 3#   Sidestepping       3 laps gtb    TB monster walk       3 laps gtb    [place] TKE     2x10 14#  2x10 17#    Ther Ex           HEP review / edu           Nu-Step - LE only           Rec bike 8' L2 8' L2 8' L2 8' L2 8' L2 8' L2 8' L3 8' L3   LAQ   3x10 4# 3x10 5# 3x10 5#  3x10 5# 3x10 5#   Stand hip 3 way  Abd only 2x10 B 5#         Prone quad S 4x30\" strap   4x30\" strap  4x30\" strap     Supine HS S      4x30\" strap 4x30\" strap    S/L hip abd   Knee bent 2x10 Knee bent 2x10  Knee bent 10x    Knee ext 2x5  2x8   Kickstand RDLs     2x8 R 10# kb      R SL leg press       10x 45#  10x 55#  10x 65# 10x 65#  10x 75#  10x 85#   Ther Activity           STS 10x 0#  10x 2# 10x 3#  10x 4# 10x 4#  10x 5# 2x10 5#  2x10 6#  2x10 8#   FSU 15x 6\" 15x 9\" 15x 9\"   Runner's 15x alt LE 9\" Runner's 15x alt LE 9\"    LSU 15x 6\" 15x 9\" 15x 9\"        Ecc lat step downs 2x10 4\" 2x10 4\"         Ecc fwd step downs   2x10 4\"     2x10 4\"   Half kneel transfer training   NV --> 5x B to 4\" step and blue and green pads 5x B to 4\" step and blue and green pads   8x B to 4\" step and blue and green pads   Floor transfer training        NV -->   Gait Training           No AD           SPC           Modalities                                                                      "

## 2024-11-15 ENCOUNTER — APPOINTMENT (OUTPATIENT)
Age: 54
End: 2024-11-15
Payer: COMMERCIAL

## 2024-11-18 ENCOUNTER — OFFICE VISIT (OUTPATIENT)
Age: 54
End: 2024-11-18
Payer: COMMERCIAL

## 2024-11-18 DIAGNOSIS — Z96.651 STATUS POST TOTAL RIGHT KNEE REPLACEMENT: ICD-10-CM

## 2024-11-18 DIAGNOSIS — M25.561 CHRONIC PAIN OF RIGHT KNEE: Primary | ICD-10-CM

## 2024-11-18 DIAGNOSIS — M17.11 OSTEOARTHRITIS OF RIGHT KNEE, UNSPECIFIED OSTEOARTHRITIS TYPE: ICD-10-CM

## 2024-11-18 DIAGNOSIS — G89.29 CHRONIC PAIN OF RIGHT KNEE: Primary | ICD-10-CM

## 2024-11-18 PROCEDURE — 97112 NEUROMUSCULAR REEDUCATION: CPT

## 2024-11-18 PROCEDURE — 97110 THERAPEUTIC EXERCISES: CPT

## 2024-11-18 PROCEDURE — 97140 MANUAL THERAPY 1/> REGIONS: CPT

## 2024-11-18 NOTE — PROGRESS NOTES
"Daily Note     Today's date: 2024  Patient name: Pati Meyers  : 1970  MRN: [id number]  Referring provider: Melvin Fernandes MD  Dx:   Encounter Diagnosis     ICD-10-CM    1. Chronic pain of right knee  M25.561     G89.29       2. Osteoarthritis of right knee, unspecified osteoarthritis type  M17.11       3. Status post total right knee replacement  Z96.651           Start Time: 1633  Stop Time: 1715  Total time in clinic (min): 42 minutes    Subjective: Pt reports significant increase in stiffness and soreness today; although, she has been more active recently.      Objective: See treatment diary below    R knee flexion PROM = 123 degrees    Assessment: Tolerated treatment well. Patient would benefit from continued PT.  Discussed with pt that an increase in soreness/stiffness can be related to an increase in activity.  Functionality is steadily improving.  Held floor transfer training this visit secondary to pt request due to discomfort.  Moderate fatigue notes post-session.  1:1 with Jaziel Aggarwal DPT entirety of tx.        Plan: Continue per plan of care.  Progress treatment as tolerated.       Precautions: R TKA 10/3/2024    POC expires Unit limit Auth Expiration date PT/OT + Visit Limit?   24 BOMN N/A BOMN     Visit/Unit Tracking  AUTH Status:  Date     Used 14 15 16 17 18 19    Remaining             Pertinent Findings:                                                                                            Test / Measure  2024 10/7/2024 2024   FOTO Post-Op (Predicted 70) 40 Pre-Op 47 Post-Op 61   R knee extension MMT 3+/5 3-/5    R hip flexion MMT 3+/5 3-/5    R knee ROM range 0-105 2-80    TUG 10.44s UE use on descent 14.50s UE assist, rollator          Manuals    R knee PROM, stretching MM 8' MM 8' MM 8' MM 8' MM 8' MM 8' MM 8'                                 Neuro Re-Ed          Heel slides 15x10\" " "strap 15x10\" strap 15x10\" strap 15x10\" strap 15x10\" strap 15x10\" strap 15x10\" strap   QS + SLR 2x10 2# 2x10 3# 2x10 3#   2x10 3# 2x10 3#   Sidestepping     3 laps gtb  4 laps thick green   TB monster walk     3 laps gtb     David TKE   2x10 14#  2x10 17#     R SLS       3x30\" green pad   Ther Ex          HEP review / edu          Nu-Step - LE only          Rec bike 8' L2 8' L2 8' L2 8' L2 8' L3 8' L3 8' L3   LAQ 3x10 4# 3x10 5# 3x10 5#  3x10 5# 3x10 5# 3x10 5#   Stand hip 3 way          Prone quad S  4x30\" strap  4x30\" strap      Supine HS S    4x30\" strap 4x30\" strap     S/L hip abd Knee bent 2x10 Knee bent 2x10  Knee bent 10x    Knee ext 2x5  2x8    Kickstand RDLs   2x8 R 10# kb       R SL leg press     10x 45#  10x 55#  10x 65# 10x 65#  10x 75#  10x 85# 3x10 85#   Ther Activity          STS 10x 4#  10x 5# 2x10 5#  2x10 6#  2x10 8#    FSU 15x 9\"   Runner's 15x alt LE 9\" Runner's 15x alt LE 9\"     LSU 15x 9\"         Ecc lat step downs          Ecc fwd step downs 2x10 4\"     2x10 4\"    Half kneel transfer training NV --> 5x B to 4\" step and blue and green pads 5x B to 4\" step and blue and green pads   8x B to 4\" step and blue and green pads    Floor transfer training      NV -->    Gait Training          No AD          SPC          Modalities                                                                     "

## 2024-11-20 ENCOUNTER — OFFICE VISIT (OUTPATIENT)
Age: 54
End: 2024-11-20
Payer: COMMERCIAL

## 2024-11-20 DIAGNOSIS — M25.561 CHRONIC PAIN OF RIGHT KNEE: Primary | ICD-10-CM

## 2024-11-20 DIAGNOSIS — M17.11 OSTEOARTHRITIS OF RIGHT KNEE, UNSPECIFIED OSTEOARTHRITIS TYPE: ICD-10-CM

## 2024-11-20 DIAGNOSIS — G89.29 CHRONIC PAIN OF RIGHT KNEE: Primary | ICD-10-CM

## 2024-11-20 DIAGNOSIS — Z96.651 STATUS POST TOTAL RIGHT KNEE REPLACEMENT: ICD-10-CM

## 2024-11-20 PROCEDURE — 97112 NEUROMUSCULAR REEDUCATION: CPT

## 2024-11-20 PROCEDURE — 97110 THERAPEUTIC EXERCISES: CPT

## 2024-11-20 PROCEDURE — 97140 MANUAL THERAPY 1/> REGIONS: CPT

## 2024-11-20 NOTE — PROGRESS NOTES
Daily Note     Today's date: 2024  Patient name: Pati Meyers  : 1970  MRN: [id number]  Referring provider: Melvin Fernandes MD  Dx:   Encounter Diagnosis     ICD-10-CM    1. Chronic pain of right knee  M25.561     G89.29       2. Osteoarthritis of right knee, unspecified osteoarthritis type  M17.11       3. Status post total right knee replacement  Z96.651           Start Time: 1200  Stop Time: 1224  Total time in clinic (min): 24 minutes    Subjective: Pt reprts sleeping better last night - wore compression sleeves which allowed her to sleep through the night.  States R knee continues to feel swollen.        Objective: See treatment diary below      Assessment: Tolerated treatment well. Patient would benefit from continued PT.  Pt able to complete floor transfer independently utilizing parallel bar to complete fully.  Pt had difficulty getting to the ground and up but was able to complete independently.  R hip abductor soreness and tightness noted throughout tx session.  1:1 with Jaziel Aggarwal DPT entirety of tx.        Plan: Continue per plan of care.  Progress treatment as tolerated.       Precautions: R TKA 10/3/2024    POC expires Unit limit Auth Expiration date PT/OT + Visit Limit?   24 BOMN N/A BOMN     Visit/Unit Tracking  AUTH Status:  Date     Used 14 15 16 17 18 19 20    Remaining              Pertinent Findings:                                                                                            Test / Measure  2024 10/7/2024 2024   FOTO Post-Op (Predicted 70) 40 Pre-Op 47 Post-Op 61   R knee extension MMT 3+/5 3-/5    R hip flexion MMT 3+/5 3-/5    R knee ROM range 0-105 2-80    TUG 10.44s UE use on descent 14.50s UE assist, rollator          Manuals    R knee PROM, stretching MM 8' MM 8' MM 8' MM 8' MM 8' MM 8' MM 8'                                 Neuro Re-Ed          Heel slides  "15x10\" strap 15x10\" strap 15x10\" strap 15x10\" strap 15x10\" strap 15x10\" strap 15x10\" strap   QS + SLR 2x10 3# 2x10 3#   2x10 3# 2x10 3#    Sidestepping    3 laps gtb  4 laps thick green 4 laps thick green   TB monster walk    3 laps gtb      David TKE  2x10 14#  2x10 17#      R SLS      3x30\" green pad 3x30\" green pad   Ther Ex          HEP review / edu          Nu-Step - LE only          Rec bike 8' L2 8' L2 8' L2 8' L3 8' L3 8' L3 8' L3   LAQ 3x10 5# 3x10 5#  3x10 5# 3x10 5# 3x10 5# 3x10 5#   Stand hip 3 way          Prone quad S 4x30\" strap  4x30\" strap       Supine HS S   4x30\" strap 4x30\" strap      S/L hip abd Knee bent 2x10  Knee bent 10x    Knee ext 2x5  2x8     Kickstand RDLs  2x8 R 10# kb        R SL leg press    10x 45#  10x 55#  10x 65# 10x 65#  10x 75#  10x 85# 3x10 85# 10x 85#  2x10 95#   Hip abductor S       10x10\"   Ther Activity          STS 2x10 5#  2x10 6#  2x10 8#  10x 10#    10x + mtb at knees   FSU   Runner's 15x alt LE 9\" Runner's 15x alt LE 9\"      LSU          Ecc lat step downs          Ecc fwd step downs     2x10 4\"  2x10 4\"   Half kneel transfer training 5x B to 4\" step and blue and green pads 5x B to 4\" step and blue and green pads   8x B to 4\" step and blue and green pads     Floor transfer training     NV -->  1x   Gait Training          No AD          SPC          Modalities                                                                       "

## 2024-11-25 ENCOUNTER — OFFICE VISIT (OUTPATIENT)
Age: 54
End: 2024-11-25
Payer: COMMERCIAL

## 2024-11-25 DIAGNOSIS — G89.29 CHRONIC PAIN OF RIGHT KNEE: Primary | ICD-10-CM

## 2024-11-25 DIAGNOSIS — Z96.651 STATUS POST TOTAL RIGHT KNEE REPLACEMENT: ICD-10-CM

## 2024-11-25 DIAGNOSIS — M25.561 CHRONIC PAIN OF RIGHT KNEE: Primary | ICD-10-CM

## 2024-11-25 DIAGNOSIS — M17.11 OSTEOARTHRITIS OF RIGHT KNEE, UNSPECIFIED OSTEOARTHRITIS TYPE: ICD-10-CM

## 2024-11-25 PROCEDURE — 97112 NEUROMUSCULAR REEDUCATION: CPT

## 2024-11-25 PROCEDURE — 97110 THERAPEUTIC EXERCISES: CPT

## 2024-11-25 PROCEDURE — 97140 MANUAL THERAPY 1/> REGIONS: CPT

## 2024-11-25 PROCEDURE — 97530 THERAPEUTIC ACTIVITIES: CPT

## 2024-11-25 NOTE — PROGRESS NOTES
Daily Note     Today's date: 2024  Patient name: Pati Meyers  : 1970  MRN: [id number]  Referring provider: Melvin Fernandes MD  Dx:   Encounter Diagnosis     ICD-10-CM    1. Chronic pain of right knee  M25.561     G89.29       2. Osteoarthritis of right knee, unspecified osteoarthritis type  M17.11       3. Status post total right knee replacement  Z96.651           Start Time: 1630  Stop Time: 1720  Total time in clinic (min): 50 minutes    Subjective: Pt reports continued R knee discomfort, but she states that it does not feel as tight.  Decreased lateral knee discomfort.        Objective: See treatment diary below    R knee flexion PROM = 124 degrees    Assessment: Tolerated treatment well. Patient would benefit from continued PT.  Pt able to tolerate continuation of planned therapeutic interventions.  Most challenged with hip abductor targeted strengthening/motor control exercises.  R knee mobility nearing 125 degrees flexion.  Mild fatigue noted post-session.  Decreased distal quadriceps sensitivity.  Mild crepitus persists.  1:1 with Jaziel Aggarwal DPT entirety of tx.        Plan: Continue per plan of care.  Progress treatment as tolerated.       Precautions: R TKA 10/3/2024    POC expires Unit limit Auth Expiration date PT/OT + Visit Limit?   24 BOMN N/A BOMN     Visit/Unit Tracking  AUTH Status:  Date     Used 16 17 18 19 20 21    Remaining             Pertinent Findings:                                                                                            Test / Measure  2024 10/7/2024 2024   FOTO Post-Op (Predicted 70) 40 Pre-Op 47 Post-Op 61   R knee extension MMT 3+/5 3-/5    R hip flexion MMT 3+/5 3-/5    R knee ROM range 0-105 2-80    TUG 10.44s UE use on descent 14.50s UE assist, rollator          Manuals    R knee PROM, stretching MM 8' MM 8' MM 8' MM 8' MM 8' MM 8' MM 8'                 "                 Neuro Re-Ed          Heel slides 15x10\" strap 15x10\" strap 15x10\" strap 15x10\" strap 15x10\" strap 15x10\" strap 15x10\" strap   QS + SLR 2x10 3#   2x10 3# 2x10 3#     Sidestepping   3 laps gtb  4 laps thick green 4 laps thick green    TB monster walk   3 laps gtb       [place] TKE 2x10 14#  2x10 17#       R SLS     3x30\" green pad 3x30\" green pad 3x30\" blue pad   Hip hike on step       2x10   Ther Ex          HEP review / edu          Nu-Step - LE only          Rec bike 8' L2 8' L2 8' L3 8' L3 8' L3 8' L3 8' L3   LAQ 3x10 5#  3x10 5# 3x10 5# 3x10 5# 3x10 5#    Stand hip 3 way          Prone quad S  4x30\" strap     4x30\" strap   Supine HS S  4x30\" strap 4x30\" strap    4x30\" strap   S/L hip abd  Knee bent 10x    Knee ext 2x5  2x8      Kickstand RDLs 2x8 R 10# kb         R SL leg press   10x 45#  10x 55#  10x 65# 10x 65#  10x 75#  10x 85# 3x10 85# 10x 85#  2x10 95# 3x10 95#   Leg press       3x10 165#   Hip abductor S      10x10\"    Ther Activity          STS  2x10 6#  2x10 8#  10x 10#    10x + mtb at knees 2x10 + mtb at knees   [employer]  Runner's 15x alt LE 9\" Runner's 15x alt LE 9\"       LSU          Ecc lat step downs          Ecc fwd step downs    2x10 4\"  2x10 4\"    Half kneel transfer training 5x B to 4\" step and blue and green pads   8x B to 4\" step and blue and green pads      Floor transfer training    NV -->  1x    Gait Training          No AD          SPC          Modalities                                                                         "

## 2024-12-03 ENCOUNTER — OFFICE VISIT (OUTPATIENT)
Age: 54
End: 2024-12-03
Payer: COMMERCIAL

## 2024-12-03 DIAGNOSIS — G89.29 CHRONIC PAIN OF RIGHT KNEE: Primary | ICD-10-CM

## 2024-12-03 DIAGNOSIS — M25.561 CHRONIC PAIN OF RIGHT KNEE: Primary | ICD-10-CM

## 2024-12-03 DIAGNOSIS — Z96.651 STATUS POST TOTAL RIGHT KNEE REPLACEMENT: ICD-10-CM

## 2024-12-03 DIAGNOSIS — M17.11 OSTEOARTHRITIS OF RIGHT KNEE, UNSPECIFIED OSTEOARTHRITIS TYPE: ICD-10-CM

## 2024-12-03 PROCEDURE — 97112 NEUROMUSCULAR REEDUCATION: CPT

## 2024-12-03 PROCEDURE — 97530 THERAPEUTIC ACTIVITIES: CPT

## 2024-12-03 PROCEDURE — 97110 THERAPEUTIC EXERCISES: CPT

## 2024-12-03 NOTE — PROGRESS NOTES
"Daily Note     Today's date: 12/3/2024  Patient name: Pati Meyres  : 1970  MRN: [id number]  Referring provider: Melvin Fernandes MD  Dx:   Encounter Diagnosis     ICD-10-CM    1. Chronic pain of right knee  M25.561     G89.29       2. Osteoarthritis of right knee, unspecified osteoarthritis type  M17.11       3. Status post total right knee replacement  Z96.651           Start Time: 1715  Stop Time: 1800  Total time in clinic (min): 45 minutes    Subjective: Pt reports having difficulty shopping for [employer] secondary to prolonged standing/walking in R knee.        Objective: See treatment diary below    R knee flexion = 127 degrees    Assessment: Tolerated treatment well. Patient would benefit from continued PT.  Pt able to tolerate continuation of POC this visit.  Discussed discharge planning.  Continued mobility improvement R knee.  1:1 with Jaziel Aggarwal DPT entirety of tx.        Plan: Continue per plan of care.  Progress treatment as tolerated.       Precautions: R TKA 10/3/2024    POC expires Unit limit Auth Expiration date PT/OT + Visit Limit?   24 BOMN N/A BOMN     Visit/Unit Tracking  AUTH Status:  Date 11/7 11/11 11/14 11/18 11/20 11/25 12/3    Used 16 17 18 19 20 21 22    Remaining              Pertinent Findings:                                                                                            Test / Measure  2024 10/7/2024 2024   FOTO Post-Op (Predicted 70) 40 Pre-Op 47 Post-Op 61   R knee extension MMT 3+/5 3-/5    R hip flexion MMT 3+/5 3-/5    R knee ROM range 0-105 2-80    TUG 10.44s UE use on descent 14.50s UE assist, rollator          Manuals 11/7 11/11 11/14 11/18 11/20 11/25 12/3   R knee PROM, stretching MM 8' MM 8' MM 8' MM 8' MM 8' MM 8' MM 5'                                 Neuro Re-Ed          Heel slides 15x10\" strap 15x10\" strap 15x10\" strap 15x10\" strap 15x10\" strap 15x10\" strap 15x10\" strap   QS + SLR   2x10 3# 2x10 3#      Sidestepping  3 " "laps gtb  4 laps thick green 4 laps thick green     TB monster walk  3 laps gtb        [place] TKE  2x10 17#     Prone 20x5\"   R SLS    3x30\" green pad 3x30\" green pad 3x30\" blue pad    Hip hike on step      2x10    Ther Ex          HEP review / edu          Nu-Step - LE only          Rec bike 8' L2 8' L3 8' L3 8' L3 8' L3 8' L3 8' L4   LAQ  3x10 5# 3x10 5# 3x10 5# 3x10 5#     Stand hip 3 way          Prone quad S 4x30\" strap     4x30\" strap 4x30\" strap   Supine HS S 4x30\" strap 4x30\" strap    4x30\" strap    S/L hip abd Knee bent 10x    Knee ext 2x5  2x8       Kickstand RDLs          R SL leg press  10x 45#  10x 55#  10x 65# 10x 65#  10x 75#  10x 85# 3x10 85# 10x 85#  2x10 95# 3x10 95# 3x10 95#   Leg press      3x10 165# 3x10 165#   Hip abductor S     10x10\"     Ther Activity          STS 2x10 6#  2x10 8#  10x 10#    10x + mtb at knees 2x10 + mtb at knees 2x10 + mtb at knees   [employer] Runner's 15x alt LE 9\" Runner's 15x alt LE 9\"        LSU          Ecc lat step downs          Ecc fwd step downs   2x10 4\"  2x10 4\"     Half kneel transfer training   8x B to 4\" step and blue and green pads       Floor transfer training   NV -->  1x     Gait Training          No AD          SPC          Modalities                                                                           "

## 2024-12-10 ENCOUNTER — OFFICE VISIT (OUTPATIENT)
Age: 54
End: 2024-12-10
Payer: COMMERCIAL

## 2024-12-10 DIAGNOSIS — Z96.651 STATUS POST TOTAL RIGHT KNEE REPLACEMENT: ICD-10-CM

## 2024-12-10 DIAGNOSIS — M17.11 OSTEOARTHRITIS OF RIGHT KNEE, UNSPECIFIED OSTEOARTHRITIS TYPE: ICD-10-CM

## 2024-12-10 DIAGNOSIS — G89.29 CHRONIC PAIN OF RIGHT KNEE: Primary | ICD-10-CM

## 2024-12-10 DIAGNOSIS — M25.561 CHRONIC PAIN OF RIGHT KNEE: Primary | ICD-10-CM

## 2024-12-10 PROCEDURE — 97112 NEUROMUSCULAR REEDUCATION: CPT

## 2024-12-10 PROCEDURE — 97110 THERAPEUTIC EXERCISES: CPT

## 2024-12-10 PROCEDURE — 97530 THERAPEUTIC ACTIVITIES: CPT

## 2024-12-10 NOTE — PROGRESS NOTES
"Daily Note     Today's date: 12/10/2024  Patient name: Pati Meyers  : 1970  MRN: [id number]  Referring provider: Melvin Fernandes MD  Dx:   Encounter Diagnosis     ICD-10-CM    1. Chronic pain of right knee  M25.561     G89.29       2. Osteoarthritis of right knee, unspecified osteoarthritis type  M17.11       3. Status post total right knee replacement  Z96.651           Start Time: 1155  Stop Time: 1219  Total time in clinic (min): 24 minutes    Subjective: Pt reports being able to complete 1 mile outdoor walk/hike.  States that R knee has been feeling better than L knee at this time.  Lateral thigh/knee discomfort persists.        Objective: See treatment diary below      Assessment: Tolerated treatment well. Patient would benefit from continued PT.  Good tolerance to progression of planned therapeutic interventions this visit.  Knee flexion PROM = 125 degrees.  Difficulty performing squats - utilized box squats this visit.  1:1 with Jaziel Aggarwal DPT entirety of tx.        Plan: Potential discharge next visit.     Precautions: R TKA 10/3/2024    POC expires Unit limit Auth Expiration date PT/OT + Visit Limit?   24 BOMN N/A BOMN     Visit/Unit Tracking  AUTH Status:  Date 11/18 11/20 11/25 12/3 12/10    Used 19  22 23    Remaining            Pertinent Findings:                                                                                            Test / Measure  2024 10/7/2024 2024   FOTO Post-Op (Predicted 70) 40 Pre-Op 47 Post-Op 61   R knee extension MMT 3+/5 3-/5    R hip flexion MMT 3+/5 3-/5    R knee ROM range 0-105 2-80    TUG 10.44s UE use on descent 14.50s UE assist, rollator          Manuals 11/7 11/11 11/14 11/18 11/20 11/25 12/3 12/10   R knee PROM, stretching MM 8' MM 8' MM 8' MM 8' MM 8' MM 8' MM 5'                                     Neuro Re-Ed           Heel slides 15x10\" strap 15x10\" strap 15x10\" strap 15x10\" strap 15x10\" strap 15x10\" strap 15x10\" strap " "15x10\" strap   QS + SLR   2x10 3# 2x10 3#       Sidestepping  3 laps gtb  4 laps thick green 4 laps thick green   4 laps thick green   TB monster walk  3 laps gtb         [place] TKE  2x10 17#     Prone 20x5\"    R SLS    3x30\" green pad 3x30\" green pad 3x30\" blue pad  3x30\" blue pad   Hip hike on step      2x10     Ther Ex           HEP review / edu           Nu-Step - LE only           Rec bike 8' L2 8' L3 8' L3 8' L3 8' L3 8' L3 8' L4 8' L4   LAQ  3x10 5# 3x10 5# 3x10 5# 3x10 5#      Stand hip 3 way           Prone quad S 4x30\" strap     4x30\" strap 4x30\" strap 4x30\" strap   Supine HS S 4x30\" strap 4x30\" strap    4x30\" strap     S/L hip abd Knee bent 10x    Knee ext 2x5  2x8        Kickstand RDLs        2x10 5#   R SL leg press  10x 45#  10x 55#  10x 65# 10x 65#  10x 75#  10x 85# 3x10 85# 10x 85#  2x10 95# 3x10 95# 3x10 95# 3x10 95#   Leg press      3x10 165# 3x10 165# 3x10 185#   Hip abductor S     10x10\"      Squats        To chair + foam 3x5   Ther Activity           STS 2x10 6#  2x10 8#  10x 10#    10x + mtb at knees 2x10 + mtb at knees 2x10 + mtb at knees 2x10 5# + mtb at knees   [employer] Runner's 15x alt LE 9\" Runner's 15x alt LE 9\"         LSU           Ecc lat step downs           Ecc fwd step downs   2x10 4\"  2x10 4\"   2x10 4\"   Half kneel transfer training   8x B to 4\" step and blue and green pads        Floor transfer training   NV -->  1x      Gait Training           No AD           SPC           Modalities                                                                                "

## 2024-12-17 ENCOUNTER — APPOINTMENT (OUTPATIENT)
Age: 54
End: 2024-12-17
Payer: COMMERCIAL

## 2024-12-19 ENCOUNTER — OFFICE VISIT (OUTPATIENT)
Age: 54
End: 2024-12-19
Payer: COMMERCIAL

## 2024-12-19 DIAGNOSIS — Z96.651 STATUS POST TOTAL RIGHT KNEE REPLACEMENT: ICD-10-CM

## 2024-12-19 DIAGNOSIS — G89.29 CHRONIC PAIN OF RIGHT KNEE: Primary | ICD-10-CM

## 2024-12-19 DIAGNOSIS — M25.561 CHRONIC PAIN OF RIGHT KNEE: Primary | ICD-10-CM

## 2024-12-19 DIAGNOSIS — M17.11 OSTEOARTHRITIS OF RIGHT KNEE, UNSPECIFIED OSTEOARTHRITIS TYPE: ICD-10-CM

## 2024-12-19 PROCEDURE — 97110 THERAPEUTIC EXERCISES: CPT

## 2024-12-19 PROCEDURE — 97164 PT RE-EVAL EST PLAN CARE: CPT

## 2024-12-19 PROCEDURE — 97112 NEUROMUSCULAR REEDUCATION: CPT

## 2024-12-19 NOTE — PROGRESS NOTES
Discharge Summary     Today's date: 2024  Patient name: Pati Meyers  : 1970  MRN: [id number]  Referring provider: Melvin Fernandes MD  Dx:   Encounter Diagnosis     ICD-10-CM    1. Chronic pain of right knee  M25.561     G89.29       2. Osteoarthritis of right knee, unspecified osteoarthritis type  M17.11       3. Status post total right knee replacement  Z96.651           Start Time:   Stop Time: 174  Total time in clinic (min): 31 minutes    Subjective: Pt reports R knee rehab status at 85%.  Morning and work stiffness is most limiting factor.  Prolonged sitting at work causes stiffness.  0/10 pain, biggest complaint is stiffness.  Knee never gives out/judy.  Pt states L knee may be ready for TKA as it is grinding and very painful.        Objective: See treatment diary below    Range of Motion: Goniometric revealed the following findings (in degrees).  Joint Motion Right: Left:   Knee Extension 0 0   Knee Flexion 127 107     Strength: MMT revealed the following findings.  Joint Motion Right:  Left:   Hip Flexion 5/5 3+/5   Hip Abduction 5/5 3+/5   Hip Adduction 5/5 3+/5   Hip Extension 5/5 3+/5   Knee Extension 5/5 3+/5   Knee Flexion 5/5 3+/5   Ankle Plantarflexion 5/5 4/5   Ankle Dorsiflexion 5/5 4/5       Assessment: All short-term and long-term goals have been met.  Pt with return to better than PLOF.  Pain-free AROM.  Mild stiffness persists.  Pt plans to return next year for contralateral TKA.  Tolerated treatment well. Patient exhibited good technique with therapeutic exercises.  1:1 with Jaziel Aggarwal DPT entirety of tx.      Assessment details: Pati Meyers is a 54 y.o. female presenting for post-op of R TKA 10/3/2024.  Primary impairments include R knee pain with functional activities, RLE weakness, R knee flexion AROM dysfunction, quadriceps motor control dysfunction.  Educated pt on anatomy and physiology of diagnosis.  Will benefit from skilled PT interventions for  community reintegration, ADL management/independence, return to work/hobbies.  Provided pt with written home exercise program to be completed daily.  Tolerated treatment well. Patient would benefit from continued PT.  1:1 with Jaziel Aggarwal DPT entirety of tx.    Impairments: abnormal coordination, abnormal gait, abnormal muscle firing, abnormal muscle tone, abnormal or restricted ROM, activity intolerance, impaired balance, impaired physical strength, lacks appropriate home exercise program, pain with function, weight-bearing intolerance, poor posture  and poor body mechanics  Functional limitations: bending, lifting, carrying, walking, stair negotiation, transfers, bed mobility  Symptom irritability: high    Understanding of Dx/Px/POC: good     Prognosis: good    Goals    Short Term Goals:  In 6 weeks, the patient will:  1. Improve R knee extension to 0 degrees - MET  2. Improve R knee flexion AROM to at least 110 degrees - MET  3. Supervision with HEP for self-care - MET    Long Term Goals:  In 12 weeks, the patient will:  1. Improve R hip flexion and R knee extension strength to at least 4+/5 MMT - MET  2. FOTO to greater than predicted value - MET  3. Independent with HEP for self-care - MET    Plan: Discharge from skilled outpatient physical therapy services at this time.  Pt would benefit from completion of written home exercise program independently.       Precautions: R TKA 10/3/2024    POC expires Unit limit Auth Expiration date PT/OT + Visit Limit?   12/19/24 BOMN N/A BOMN     Visit/Unit Tracking  AUTH Status:  Date 11/18 11/20 11/25 12/3 12/10 12/19    Used 19 20 21 22 23 24    Remaining             Pertinent Findings:                                                                                            Test / Measure  9/26/2024 10/7/2024 11/4/2024 12/19/2024   FOTO Post-Op (Predicted 70) 40 Pre-Op 47 Post-Op 61 81   R knee extension MMT 3+/5 3-/5  5/5   R hip flexion MMT 3+/5 3-/5  5/5   R knee ROM  "range 0-105 2-80  0-127   TUG 10.44s UE use on descent 14.50s UE assist, rollator  8s         Manuals 11/7 11/11 11/14 11/18 11/20 11/25 12/3 12/10 12/19   R knee PROM, stretching MM 8' MM 8' MM 8' MM 8' MM 8' MM 8' MM 5'  MM 5'                                       Neuro Re-Ed            Heel slides 15x10\" strap 15x10\" strap 15x10\" strap 15x10\" strap 15x10\" strap 15x10\" strap 15x10\" strap 15x10\" strap 5x10\" strap   QS + SLR   2x10 3# 2x10 3#        Sidestepping  3 laps gtb  4 laps thick green 4 laps thick green   4 laps thick green    TB monster walk  3 laps gtb          [place] TKE  2x10 17#     Prone 20x5\"     R SLS    3x30\" green pad 3x30\" green pad 3x30\" blue pad  3x30\" blue pad    Hip hike on step      2x10      Ther Ex            HEP review / edu            Nu-Step - LE only            Rec bike 8' L2 8' L3 8' L3 8' L3 8' L3 8' L3 8' L4 8' L4 8' L4   LAQ  3x10 5# 3x10 5# 3x10 5# 3x10 5#    [place] 3x10 ea + HS curl 10#   Stand hip 3 way            Prone quad S 4x30\" strap     4x30\" strap 4x30\" strap 4x30\" strap    Supine HS S 4x30\" strap 4x30\" strap    4x30\" strap      S/L hip abd Knee bent 10x    Knee ext 2x5  2x8         Kickstand RDLs        2x10 5#    R SL leg press  10x 45#  10x 55#  10x 65# 10x 65#  10x 75#  10x 85# 3x10 85# 10x 85#  2x10 95# 3x10 95# 3x10 95# 3x10 95# 3x10 95#   Leg press      3x10 165# 3x10 165# 3x10 185# 3x10 185#   Hip abductor S     10x10\"       Squats        To chair + foam 3x5    Ther Activity            STS 2x10 6#  2x10 8#  10x 10#    10x + mtb at knees 2x10 + mtb at knees 2x10 + mtb at knees 2x10 5# + mtb at knees    [employer] Runner's 15x alt LE 9\" Runner's 15x alt LE 9\"          LSU            Ecc lat step downs            Ecc fwd step downs   2x10 4\"  2x10 4\"   2x10 4\"    Half kneel transfer training   8x B to 4\" step and blue and green pads         Floor transfer training   NV -->  1x       Gait Training            No AD            SPC            Modalities                          "

## 2024-12-20 ENCOUNTER — APPOINTMENT (OUTPATIENT)
Age: 54
End: 2024-12-20
Payer: COMMERCIAL